# Patient Record
Sex: MALE | Race: WHITE | NOT HISPANIC OR LATINO | URBAN - METROPOLITAN AREA
[De-identification: names, ages, dates, MRNs, and addresses within clinical notes are randomized per-mention and may not be internally consistent; named-entity substitution may affect disease eponyms.]

---

## 2017-06-02 ENCOUNTER — OFFICE (OUTPATIENT)
Dept: URBAN - METROPOLITAN AREA CLINIC 70 | Facility: CLINIC | Age: 67
End: 2017-06-02

## 2017-06-02 VITALS
DIASTOLIC BLOOD PRESSURE: 56 MMHG | HEART RATE: 90 BPM | HEIGHT: 73 IN | WEIGHT: 208 LBS | SYSTOLIC BLOOD PRESSURE: 128 MMHG

## 2017-06-02 DIAGNOSIS — R63.4 ABNORMAL WEIGHT LOSS: ICD-10-CM

## 2017-06-02 DIAGNOSIS — K57.30 DIVERTICULOSIS OF LARGE INTESTINE WITHOUT PERFORATION OR ABS: ICD-10-CM

## 2017-06-02 DIAGNOSIS — R14.0 ABDOMINAL DISTENSION (GASEOUS): ICD-10-CM

## 2017-06-02 DIAGNOSIS — R10.84 GENERALIZED ABDOMINAL PAIN: ICD-10-CM

## 2017-06-02 DIAGNOSIS — K59.1 FUNCTIONAL DIARRHEA: ICD-10-CM

## 2017-06-02 DIAGNOSIS — Z86.010 PERSONAL HISTORY OF COLONIC POLYPS: ICD-10-CM

## 2017-06-02 PROCEDURE — 99205 OFFICE O/P NEW HI 60 MIN: CPT | Performed by: INTERNAL MEDICINE

## 2017-06-02 RX ORDER — ONDANSETRON HYDROCHLORIDE 4 MG/1
TABLET, ORALLY DISINTEGRATING ORAL
Qty: 40 | Refills: 3 | Status: ACTIVE
Start: 2017-06-02

## 2017-06-07 ENCOUNTER — HOSPITAL ENCOUNTER (EMERGENCY)
Facility: HOSPITAL | Age: 67
Discharge: HOME | End: 2017-06-07
Attending: EMERGENCY MEDICINE | Admitting: EMERGENCY MEDICINE

## 2017-06-07 ENCOUNTER — APPOINTMENT (OUTPATIENT)
Dept: GENERAL RADIOLOGY | Facility: HOSPITAL | Age: 67
End: 2017-06-07

## 2017-06-07 ENCOUNTER — APPOINTMENT (OUTPATIENT)
Dept: CT IMAGING | Facility: HOSPITAL | Age: 67
End: 2017-06-07

## 2017-06-07 VITALS
RESPIRATION RATE: 16 BRPM | WEIGHT: 203 LBS | OXYGEN SATURATION: 96 % | SYSTOLIC BLOOD PRESSURE: 142 MMHG | HEART RATE: 82 BPM | HEIGHT: 73 IN | TEMPERATURE: 98.6 F | DIASTOLIC BLOOD PRESSURE: 74 MMHG | BODY MASS INDEX: 26.9 KG/M2

## 2017-06-07 DIAGNOSIS — R10.13 EPIGASTRIC PAIN: Primary | ICD-10-CM

## 2017-06-07 DIAGNOSIS — D72.829 LEUKOCYTOSIS, UNSPECIFIED TYPE: ICD-10-CM

## 2017-06-07 DIAGNOSIS — R42 DIZZINESS: ICD-10-CM

## 2017-06-07 LAB
ALBUMIN SERPL-MCNC: 3.8 G/DL (ref 3.5–5.2)
ALBUMIN/GLOB SERPL: 1.2 G/DL
ALP SERPL-CCNC: 112 U/L (ref 39–117)
ALT SERPL W P-5'-P-CCNC: 26 U/L (ref 1–41)
ANION GAP SERPL CALCULATED.3IONS-SCNC: 18.6 MMOL/L
AST SERPL-CCNC: 17 U/L (ref 1–40)
BASOPHILS # BLD AUTO: 0.07 10*3/MM3 (ref 0–0.2)
BASOPHILS NFR BLD AUTO: 0.5 % (ref 0–1.5)
BILIRUB SERPL-MCNC: 0.2 MG/DL (ref 0.1–1.2)
BILIRUB UR QL STRIP: NEGATIVE
BUN BLD-MCNC: 15 MG/DL (ref 8–23)
BUN/CREAT SERPL: 13.2 (ref 7–25)
CALCIUM SPEC-SCNC: 9.6 MG/DL (ref 8.6–10.5)
CHLORIDE SERPL-SCNC: 99 MMOL/L (ref 98–107)
CLARITY UR: CLEAR
CO2 SERPL-SCNC: 21.4 MMOL/L (ref 22–29)
COLOR UR: YELLOW
CREAT BLD-MCNC: 1.14 MG/DL (ref 0.76–1.27)
DEPRECATED RDW RBC AUTO: 49.7 FL (ref 37–54)
EOSINOPHIL # BLD AUTO: 0.25 10*3/MM3 (ref 0–0.7)
EOSINOPHIL NFR BLD AUTO: 1.7 % (ref 0.3–6.2)
ERYTHROCYTE [DISTWIDTH] IN BLOOD BY AUTOMATED COUNT: 14.8 % (ref 11.5–14.5)
GFR SERPL CREATININE-BSD FRML MDRD: 64 ML/MIN/1.73
GLOBULIN UR ELPH-MCNC: 3.2 GM/DL
GLUCOSE BLD-MCNC: 145 MG/DL (ref 65–99)
GLUCOSE BLDC GLUCOMTR-MCNC: 113 MG/DL (ref 70–130)
GLUCOSE BLDC GLUCOMTR-MCNC: 136 MG/DL (ref 70–130)
GLUCOSE UR STRIP-MCNC: ABNORMAL MG/DL
HCT VFR BLD AUTO: 38.6 % (ref 40.4–52.2)
HGB BLD-MCNC: 12.7 G/DL (ref 13.7–17.6)
HGB UR QL STRIP.AUTO: NEGATIVE
HOLD SPECIMEN: NORMAL
IMM GRANULOCYTES # BLD: 0.05 10*3/MM3 (ref 0–0.03)
IMM GRANULOCYTES NFR BLD: 0.3 % (ref 0–0.5)
KETONES UR QL STRIP: ABNORMAL
LEUKOCYTE ESTERASE UR QL STRIP.AUTO: NEGATIVE
LIPASE SERPL-CCNC: 93 U/L (ref 13–60)
LYMPHOCYTES # BLD AUTO: 2.08 10*3/MM3 (ref 0.9–4.8)
LYMPHOCYTES NFR BLD AUTO: 13.7 % (ref 19.6–45.3)
MAGNESIUM SERPL-MCNC: 2 MG/DL (ref 1.6–2.4)
MCH RBC QN AUTO: 30.4 PG (ref 27–32.7)
MCHC RBC AUTO-ENTMCNC: 32.9 G/DL (ref 32.6–36.4)
MCV RBC AUTO: 92.3 FL (ref 79.8–96.2)
MONOCYTES # BLD AUTO: 1.23 10*3/MM3 (ref 0.2–1.2)
MONOCYTES NFR BLD AUTO: 8.1 % (ref 5–12)
NEUTROPHILS # BLD AUTO: 11.46 10*3/MM3 (ref 1.9–8.1)
NEUTROPHILS NFR BLD AUTO: 75.7 % (ref 42.7–76)
NITRITE UR QL STRIP: NEGATIVE
PH UR STRIP.AUTO: <=5 [PH] (ref 5–8)
PLATELET # BLD AUTO: 421 10*3/MM3 (ref 140–500)
PMV BLD AUTO: 9.2 FL (ref 6–12)
POTASSIUM BLD-SCNC: 4.6 MMOL/L (ref 3.5–5.2)
PROT SERPL-MCNC: 7 G/DL (ref 6–8.5)
PROT UR QL STRIP: NEGATIVE
RBC # BLD AUTO: 4.18 10*6/MM3 (ref 4.6–6)
SODIUM BLD-SCNC: 139 MMOL/L (ref 136–145)
SP GR UR STRIP: >=1.03 (ref 1–1.03)
TROPONIN T SERPL-MCNC: <0.01 NG/ML (ref 0–0.03)
UROBILINOGEN UR QL STRIP: ABNORMAL
WBC NRBC COR # BLD: 15.14 10*3/MM3 (ref 4.5–10.7)
WHOLE BLOOD HOLD SPECIMEN: NORMAL

## 2017-06-07 PROCEDURE — 0 IOPAMIDOL 61 % SOLUTION: Performed by: EMERGENCY MEDICINE

## 2017-06-07 PROCEDURE — 93005 ELECTROCARDIOGRAM TRACING: CPT

## 2017-06-07 PROCEDURE — 82962 GLUCOSE BLOOD TEST: CPT

## 2017-06-07 PROCEDURE — 70450 CT HEAD/BRAIN W/O DYE: CPT

## 2017-06-07 PROCEDURE — 93010 ELECTROCARDIOGRAM REPORT: CPT | Performed by: INTERNAL MEDICINE

## 2017-06-07 PROCEDURE — 80053 COMPREHEN METABOLIC PANEL: CPT | Performed by: EMERGENCY MEDICINE

## 2017-06-07 PROCEDURE — 36415 COLL VENOUS BLD VENIPUNCTURE: CPT | Performed by: EMERGENCY MEDICINE

## 2017-06-07 PROCEDURE — 96360 HYDRATION IV INFUSION INIT: CPT

## 2017-06-07 PROCEDURE — 81003 URINALYSIS AUTO W/O SCOPE: CPT | Performed by: EMERGENCY MEDICINE

## 2017-06-07 PROCEDURE — 71020 HC CHEST PA AND LATERAL: CPT

## 2017-06-07 PROCEDURE — 84484 ASSAY OF TROPONIN QUANT: CPT | Performed by: EMERGENCY MEDICINE

## 2017-06-07 PROCEDURE — 83735 ASSAY OF MAGNESIUM: CPT | Performed by: EMERGENCY MEDICINE

## 2017-06-07 PROCEDURE — 74177 CT ABD & PELVIS W/CONTRAST: CPT

## 2017-06-07 PROCEDURE — 99284 EMERGENCY DEPT VISIT MOD MDM: CPT

## 2017-06-07 PROCEDURE — 83690 ASSAY OF LIPASE: CPT | Performed by: EMERGENCY MEDICINE

## 2017-06-07 PROCEDURE — 85025 COMPLETE CBC W/AUTO DIFF WBC: CPT | Performed by: EMERGENCY MEDICINE

## 2017-06-07 RX ORDER — LISINOPRIL 20 MG/1
20 TABLET ORAL DAILY
COMMUNITY
End: 2020-02-25

## 2017-06-07 RX ORDER — ONDANSETRON 4 MG/1
4 TABLET, ORALLY DISINTEGRATING ORAL EVERY 8 HOURS PRN
COMMUNITY
End: 2018-06-21

## 2017-06-07 RX ORDER — DICYCLOMINE HCL 20 MG
20 TABLET ORAL EVERY 6 HOURS PRN
Qty: 15 TABLET | Refills: 0 | Status: SHIPPED | OUTPATIENT
Start: 2017-06-07 | End: 2018-06-21

## 2017-06-07 RX ORDER — NIACIN 500 MG
500 TABLET ORAL NIGHTLY
COMMUNITY
End: 2018-06-21

## 2017-06-07 RX ORDER — SODIUM CHLORIDE 0.9 % (FLUSH) 0.9 %
10 SYRINGE (ML) INJECTION AS NEEDED
Status: DISCONTINUED | OUTPATIENT
Start: 2017-06-07 | End: 2017-06-07 | Stop reason: HOSPADM

## 2017-06-07 RX ORDER — ASPIRIN 325 MG
325 TABLET ORAL DAILY
COMMUNITY
End: 2018-06-30 | Stop reason: HOSPADM

## 2017-06-07 RX ORDER — ATORVASTATIN CALCIUM 80 MG/1
40 TABLET, FILM COATED ORAL DAILY
COMMUNITY

## 2017-06-07 RX ORDER — GLIMEPIRIDE 4 MG/1
4 TABLET ORAL
COMMUNITY
End: 2018-06-21

## 2017-06-07 RX ORDER — PANTOPRAZOLE SODIUM 40 MG/1
40 TABLET, DELAYED RELEASE ORAL DAILY
Qty: 21 TABLET | Refills: 0 | Status: SHIPPED | OUTPATIENT
Start: 2017-06-07 | End: 2018-06-21

## 2017-06-07 RX ORDER — EZETIMIBE 10 MG/1
10 TABLET ORAL DAILY
COMMUNITY
End: 2018-06-21

## 2017-06-07 RX ORDER — MECLIZINE HYDROCHLORIDE 25 MG/1
25 TABLET ORAL EVERY 6 HOURS PRN
Qty: 20 TABLET | Refills: 0 | Status: SHIPPED | OUTPATIENT
Start: 2017-06-07 | End: 2018-06-21

## 2017-06-07 RX ADMIN — IOPAMIDOL 85 ML: 612 INJECTION, SOLUTION INTRAVENOUS at 19:07

## 2017-06-07 RX ADMIN — SODIUM CHLORIDE 1000 ML: 9 INJECTION, SOLUTION INTRAVENOUS at 18:52

## 2017-06-07 NOTE — ED NOTES
"Pt reports he just went to bathroom so unable to provide urine sample at this time. Pt reports for about 3 weeks he has had dizziness when ambulating and nausea. He also reports unexplained weight loss of 10 lbs over 3 weeks. Pt seen GI and PCP MD who tested pt for cdiff and was neg and he was told his bp dropped a lot when he stood up and MDs office. Last time pt had diarrhea was a few days ago. Pt reports \"cramping like i have gas\" intermittently on upper abd and LLQ abd. Reassurance given; call light in reach. Pts breathing even and unlabored. Pt appears in NAD at this time. Family at bedside.      Kelin Quesada RN  06/07/17 9019    "

## 2017-06-07 NOTE — ED TRIAGE NOTES
"Pt and wife informed that pt can not have anything to eat or drink until evaluated by MD and that staff would check his blood sugar if \"his sugar drops because he is type two diabetic\".   "

## 2017-06-07 NOTE — ED NOTES
"Pt c/o nausea and dizziness for three weeks. Has been evaluated already at \"[facility]\". It was suggested he come to ER because \"all my tests were normal there\".      Kylie Diego RN  06/07/17 5240    "

## 2017-06-07 NOTE — ED PROVIDER NOTES
EMERGENCY DEPARTMENT ENCOUNTER    CHIEF COMPLAINT  Chief Complaint: nausea, dizziness  History given by: patient  History limited by: nothing  Room Number: 20/20  PMD: Asad Garay MD  GI: Dr. Franco    HPI:  Pt is a 66 y.o. male who presents complaining of nausea and lightheadedness with standing x3 weeks. Pt also c/o diffuse, intermittent abdominal pain and diarrhea. Pt states his abdominal pain is alleviated with eating but he has lost 13 lbs in 3 weeks. Pt denies fever, chills, shortness of breath, dysuria, tinnitus, or headache.   Pt states he saw his PCP at the onset of his sx and had labs drawn that showed an elevated WBC so he was started on amoxicillin. Pt states he took 3 doses and then saw his GI (Dr. Franco) who advised him to stop the abx bc he had signs of C.diff. Pt states the stool culture was negative for C.diff so the Pt was advised to come to the ED.   Pt denies hx of abdominal surgery and ulcer. Pt denies any new/changes to his medications.     Duration:  3 weeks  Onset: gradual  Timing: intermittent  Location: diffuse  Intensity/Severity: moderate  Progression: unchanged  Associated Symptoms: abdominal pain, diarrhea, weight loss  Aggravating Factors: none  Alleviating Factors: eating, rest  Previous Episodes: none  Treatment before arrival: none    PAST MEDICAL HISTORY  Active Ambulatory Problems     Diagnosis Date Noted   • No Active Ambulatory Problems     Resolved Ambulatory Problems     Diagnosis Date Noted   • No Resolved Ambulatory Problems     Past Medical History:   Diagnosis Date   • Diabetes mellitus    • Hypertension        PAST SURGICAL HISTORY  Past Surgical History:   Procedure Laterality Date   • COLONOSCOPY W/ POLYPECTOMY         FAMILY HISTORY  History reviewed. No pertinent family history.    SOCIAL HISTORY  Social History     Social History   • Marital status:      Spouse name: N/A   • Number of children: N/A   • Years of education: N/A     Occupational History    • Not on file.     Social History Main Topics   • Smoking status: Former Smoker   • Smokeless tobacco: Not on file   • Alcohol use Yes      Comment: social   • Drug use: No   • Sexual activity: Not on file     Other Topics Concern   • Not on file     Social History Narrative   • No narrative on file       ALLERGIES  Review of patient's allergies indicates no known allergies.    REVIEW OF SYSTEMS  Review of Systems   Constitutional: Negative for chills, fever and unexpected weight change (-13 lbs in 3 wks).   HENT: Negative for sore throat and trouble swallowing.    Eyes: Negative for visual disturbance.   Respiratory: Negative for cough and shortness of breath.    Cardiovascular: Negative for chest pain and leg swelling.   Gastrointestinal: Positive for abdominal pain (diffuse), diarrhea and nausea. Negative for vomiting.   Endocrine: Negative.    Genitourinary: Negative for decreased urine volume and frequency.   Musculoskeletal: Negative for neck pain.   Skin: Negative for rash.   Allergic/Immunologic: Negative.    Neurological: Positive for dizziness. Negative for weakness, numbness and headaches.   Hematological: Negative.    Psychiatric/Behavioral: Negative.    All other systems reviewed and are negative.      PHYSICAL EXAM  ED Triage Vitals   Temp Heart Rate Resp BP SpO2   06/07/17 1506 06/07/17 1506 06/07/17 1506 06/07/17 1542 06/07/17 1506   98.4 °F (36.9 °C) 78 16 113/68 98 %      Temp src Heart Rate Source Patient Position BP Location FiO2 (%)   06/07/17 1506 06/07/17 1542 06/07/17 1645 06/07/17 1645 --   Tympanic Monitor Sitting Right arm        Physical Exam   Constitutional: He is oriented to person, place, and time and well-developed, well-nourished, and in no distress.   HENT:   Head: Normocephalic and atraumatic.   Eyes: EOM are normal. Pupils are equal, round, and reactive to light. Right eye exhibits no nystagmus. Left eye exhibits no nystagmus.   Neck: Normal range of motion. Neck supple.    Cardiovascular: Normal rate, regular rhythm and normal heart sounds.    Pulmonary/Chest: Effort normal and breath sounds normal. No respiratory distress.   Abdominal: Soft. There is tenderness (mild) in the right upper quadrant and epigastric area. There is no rebound and no guarding.   Musculoskeletal: Normal range of motion. He exhibits no edema.   Neurological: He is alert and oriented to person, place, and time. He has normal sensation and normal strength. He has a normal Finger-Nose-Finger Test and a normal Heel to Castle Test.   Skin: Skin is warm and dry.   Psychiatric: Mood and affect normal.   Nursing note and vitals reviewed.      LAB RESULTS  Lab Results (last 24 hours)     Procedure Component Value Units Date/Time    POC Glucose Fingerstick [[id number]]  (Abnormal) Collected:  06/07/17 1540    Specimen:  Blood Updated:  06/07/17 1547     Glucose 136 (H) mg/dL     Narrative:       Meter: [id number] : [id number] Pablito Waters    CBC & Differential [[id number]] Collected:  06/07/17 1602    Specimen:  Blood Updated:  06/07/17 1626    Narrative:       The following orders were created for panel order CBC & Differential.  Procedure                               Abnormality         Status                     ---------                               -----------         ------                     CBC Auto Differential[[id number]]        Abnormal            Final result                 Please view results for these tests on the individual orders.    Comprehensive Metabolic Panel [[id number]]  (Abnormal) Collected:  06/07/17 1602    Specimen:  Blood Updated:  06/07/17 1633     Glucose 145 (H) mg/dL      BUN 15 mg/dL      Creatinine 1.14 mg/dL      Sodium 139 mmol/L      Potassium 4.6 mmol/L      Chloride 99 mmol/L      CO2 21.4 (L) mmol/L      Calcium 9.6 mg/dL      Total Protein 7.0 g/dL      Albumin 3.80 g/dL      ALT (SGPT) 26 U/L      AST (SGOT) 17 U/L      Alkaline Phosphatase 112 U/L      Total Bilirubin 0.2  mg/dL      eGFR Non African Amer 64 mL/min/1.73      Globulin 3.2 gm/dL      A/G Ratio 1.2 g/dL      BUN/Creatinine Ratio 13.2     Anion Gap 18.6 mmol/L     Troponin [[id number]]  (Normal) Collected:  06/07/17 1602    Specimen:  Blood Updated:  06/07/17 1635     Troponin T <0.010 ng/mL     Narrative:       Troponin T Reference Ranges:  Less than 0.03 ng/mL:    Negative for AMI  0.03 to 0.09 ng/mL:      Indeterminant for AMI  Greater than 0.09 ng/mL: Positive for AMI    Magnesium [[id number]]  (Normal) Collected:  06/07/17 1602    Specimen:  Blood Updated:  06/07/17 1633     Magnesium 2.0 mg/dL     CBC Auto Differential [[id number]]  (Abnormal) Collected:  06/07/17 1602    Specimen:  Blood Updated:  06/07/17 1626     WBC 15.14 (H) 10*3/mm3      RBC 4.18 (L) 10*6/mm3      Hemoglobin 12.7 (L) g/dL      Hematocrit 38.6 (L) %      MCV 92.3 fL      MCH 30.4 pg      MCHC 32.9 g/dL      RDW 14.8 (H) %      RDW-SD 49.7 fl      MPV 9.2 fL      Platelets 421 10*3/mm3      Neutrophil % 75.7 %      Lymphocyte % 13.7 (L) %      Monocyte % 8.1 %      Eosinophil % 1.7 %      Basophil % 0.5 %      Immature Grans % 0.3 %      Neutrophils, Absolute 11.46 (H) 10*3/mm3      Lymphocytes, Absolute 2.08 10*3/mm3      Monocytes, Absolute 1.23 (H) 10*3/mm3      Eosinophils, Absolute 0.25 10*3/mm3      Basophils, Absolute 0.07 10*3/mm3      Immature Grans, Absolute 0.05 (H) 10*3/mm3     Lipase [[id number]]  (Abnormal) Collected:  06/07/17 1602    Specimen:  Blood Updated:  06/07/17 2119     Lipase 93 (H) U/L     Urinalysis With / Culture If Indicated [[id number]]  (Abnormal) Collected:  06/07/17 1856    Specimen:  Urine from Urine, Clean Catch Updated:  06/07/17 1919     Color, UA Yellow     Appearance, UA Clear     pH, UA <=5.0     Specific Gravity, UA >=1.030     Glucose, UA >=1000 mg/dL (3+) (A)     Ketones, UA Trace (A)     Bilirubin, UA Negative     Blood, UA Negative     Protein, UA Negative     Leuk Esterase, UA Negative     Nitrite, UA  Negative     Urobilinogen, UA 0.2 E.U./dL    Narrative:       Urine microscopic not indicated.    POC Glucose Fingerstick [[id number]]  (Normal) Collected:  06/07/17 2046    Specimen:  Blood Updated:  06/07/17 2047     Glucose 113 mg/dL     Narrative:       Meter: [id number] : [id number] Dipak Neville      I ordered the above labs and reviewed the results    RADIOLOGY  CT Abdomen Pelvis With Contrast   Final Result           1. Colonic diverticulosis. No acute inflammatory process of bowel is   identified. Follow-up as clinical indications persist.   2. Slight stranding about the pancreatic tail, correlate clinically to   exclude possibility of acute pancreatitis.   3. No obstructive uropathy. Mildly enlarged prostate.   4. Aneurysmal abdominal aorta with plaque ulceration. Calcification, and   likely mild narrowing at the origin of superior and inferior mesenteric   arteries.   5. 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       Discussed by telephone with Dr. Kwok at time of interpretation, 1937   on 06/07/2017.       This report was finalized on 6/7/2017 7:42 PM by Dr. Paulino Lopez MD.          CT Head Without Contrast   Final Result           No acute intracranial hemorrhage or hydrocephalus. Chronic-appearing   changes of the brain. If there is further clinical concern, MRI could be   considered for further evaluation.       Discussed by telephone with Dr. Kwok at time of interpretation, 19 28   on 6/7/2017.       This report was finalized on 6/7/2017 7:29 PM by Dr. Paulino Lopez MD.          XR Chest 2 View   Preliminary Result   No acute process.           I ordered the above noted radiological studies. Interpreted by radiologist. Reviewed by me in PACS.       PROCEDURES  Procedures      PROGRESS AND CONSULTS  ED Course     6:39 PM  Ordered CT abd/pelvis and CT Head for further evaluation.     6:54 PM  Pt is not  orthostatic.     8:58 PM  Ordered lipase for further evaluation.     9:28 PM  Pt rechecked and is resting comfortably. Pt has tolerated liquids with no difficulty while in the ED. BP- 135/68 HR- 82 Temp- 98.4 °F (36.9 °C) (Tympanic) O2 sat- 95%. All pertinent lab/imaging/EKG findings discussed including mildly elevated WBC, slightly low Hgb as well as negative CT head, CXR and enlarged aorta and possible pancreatic abnormality seen on CT abd. Discussed with Pt that although lipase is normal it is still possible that he may have a mild pancreatitis. Pt/family verbalized understanding and agree with plan to discharge. Advised Pt to follow a clear liquid diet for the next few days. All questions and concerns addressed at this time.         MEDICAL DECISION MAKING  Results were reviewed/discussed with the patient and they were also made aware of online access. Pt also made aware that some labs, such as cultures, will not be resulted during ER visit and follow up with PMD is necessary.     MDM  Number of Diagnoses or Management Options  Dizziness:   Epigastric pain:   Leukocytosis, unspecified type:   Diagnosis management comments: Patient's white blood cell count was elevated and he was mildly anemic.  Remainder of his labs were unremarkable.  CT abdomen and pelvis showed some mild stranding around the tail of the pancreas.  There is also some haziness of the mesentery.  Head CT was unremarkable.  The patient did have mild epigastric tenderness on exam.  He may have mild pancreatitis even though his lipase was normal.  The patient has not been vomiting and says that his pain actually improves after he eats.  The patient was given IV fluids in the ER.  He will be discharged with prescriptions for Protonix and meclizine.  The patient was advised of the importance of having a follow-up CT scan of his abdomen and pelvis.  Patient was also advised to follow-up with Dr. Franco, his gastroenterologist.       Amount and/or  Complexity of Data Reviewed  Clinical lab tests: reviewed and ordered (WBC: 15.14  Lipase: 93)  Tests in the radiology section of CPT®: ordered and reviewed (CXR: Negative acute  CT Head: Negative acute  CT abd/pelvis:Colonic diverticulosis. No acute inflammatory process of bowel is identified. Follow-up as clinical indications persist. Slight stranding about the pancreatic tail, correlate clinically to exclude possibility of acute pancreatitis. No obstructive uropathy. Mildly enlarged prostate. Aneurysmal abdominal aorta with plaque ulceration. Calcification, and likely mild narrowing at the origin of superior and inferior mesenteric  Arteries. 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 Lopez)  Independent visualization of images, tracings, or specimens: yes    Patient Progress  Patient progress: stable         DIAGNOSIS  Final diagnoses:   Epigastric pain   Dizziness   Leukocytosis, unspecified type       DISPOSITION  Disposition: Discharged.    Patient discharged in stable condition.    Reviewed implications of results, diagnosis, meds, responsibility to follow up, warning signs and symptoms of possible worsening, potential complications and reasons to return to ER.    Patient/Family voiced understanding of above instructions.    Discussed plan for discharge, as there is no emergent indication for admission.  Pt/family is agreeable and understands need for follow up and repeat testing.  Pt is aware that discharge does not mean that nothing is wrong but it indicates no emergency is present and they must continue care with follow-up as given below or physician of their choice.     FOLLOW-UP  Asad Garay MD  [address] [facility] [address] [phone]    Call in 2 days  If symptoms persist         Medication List      New Prescriptions          dicyclomine 20 MG tablet    Commonly known as:  BENTYL   Take 1 tablet by mouth Every 6 (Six) Hours As Needed (abdominal pain).       meclizine 25 MG tablet   Commonly known as:  ANTIVERT   Take 1 tablet by mouth Every 6 (Six) Hours As Needed for dizziness.       pantoprazole 40 MG EC tablet   Commonly known as:  PROTONIX   Take 1 tablet by mouth Daily.             Latest Documented Vital Signs:  As of 9:43 PM  BP- 135/68 HR- 82 Temp- 98.4 °F (36.9 °C) (Tympanic) O2 sat- 95%    --  Documentation assistance provided by joaquina Daly for Dr. Kwok.  Information recorded by the scribe was done at my direction and has been verified and validated by me.        Pao Daly  06/07/17 4428       Ruiz Kwok MD  06/07/17 9373

## 2017-06-08 NOTE — DISCHARGE INSTRUCTIONS
Follow-up with your doctor in the next several days if symptoms have not resolved.  You will need a repeat CT scan of your abdomen pelvis within the next several weeks.  Take medications as prescribed.  Return to the emergency department for worsening pain, vomiting, fever, worsening dizziness, or other concern.  Stay on a clear liquid diet for the next 24 hours.

## 2017-06-08 NOTE — ED NOTES
"Pt reports beginning to feel \"queezy\" MD Kwok notified and approved to take home sandie.      Kelin Quesada RN  06/07/17 2051    "

## 2017-06-08 NOTE — ED NOTES
Pt requested to have his FSBG checked at this time. It is WDL.      Kelin Quesada RN  06/07/17 2052

## 2017-06-09 ENCOUNTER — TELEPHONE (OUTPATIENT)
Dept: SOCIAL WORK | Facility: HOSPITAL | Age: 67
End: 2017-06-09

## 2018-06-18 ENCOUNTER — PREP FOR SURGERY (OUTPATIENT)
Dept: OTHER | Facility: HOSPITAL | Age: 68
End: 2018-06-18

## 2018-06-18 DIAGNOSIS — M16.12 PRIMARY OSTEOARTHRITIS OF LEFT HIP: Primary | ICD-10-CM

## 2018-06-18 RX ORDER — CEFAZOLIN SODIUM 2 G/100ML
2 INJECTION, SOLUTION INTRAVENOUS ONCE
Status: CANCELLED | OUTPATIENT
Start: 2018-06-29 | End: 2018-06-29

## 2018-06-18 RX ORDER — PREGABALIN 75 MG/1
75 CAPSULE ORAL ONCE
Status: CANCELLED | OUTPATIENT
Start: 2018-06-29 | End: 2018-06-29

## 2018-06-18 RX ORDER — OXYCODONE HCL 10 MG/1
10 TABLET, FILM COATED, EXTENDED RELEASE ORAL ONCE
Status: CANCELLED | OUTPATIENT
Start: 2018-06-29 | End: 2018-06-29

## 2018-06-18 RX ORDER — ACETAMINOPHEN 500 MG
1000 TABLET ORAL ONCE
Status: CANCELLED | OUTPATIENT
Start: 2018-06-29 | End: 2018-06-29

## 2018-06-18 NOTE — H&P
Provider: THIEN PETER MD  HPI  TOMER is a 67 year old male whose main reason for today's visit is pain in the left hip which has been present for 6 months.  The patient denies an injury.  The patient is on pain meds(Ibuprofen).  The patient states that he is having a dull pain which he rates at a 7 on a scale from 1-10.  The pain occurs intermittently.  The pain occurs on the left side only.  The patient states that activity makes it better.   He has difficulty getting in and out of his car and putting his shoes and socks on. His pain bothers him sometimes at night and prevents sleep. He ambulates independently.  He complains of a limp.  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   He denies any history of MRSA, DVT.  He has a history of diabetes type 2.  Denies any cardiac problems.  Review of Systems:  Patient denies: Abdominal Pain, Bleeding, Chest Pain, Convulsions/Seizure, Decreased Motion, Depression, Difficulty Swallowing, Easy Bruisability, Emotional Disturbances, Eyes or Vision Problems, Fecal Incontinence, Fever/Chills, Headaches, Increased Thirst, Increased Hunger, Insomnia, Joint Pain, Nausea/Vomiting, Night Sweats, Poor Balance, Persistent Cough, Rash, Shortness of Breath, Shortness of Breath While Lying down, Skin Problems, Urinary Retention and Weakness.  Allergies:  * no known allergies  Medications:  * senior multi vitamin   aspirin 325 mg oral tablet (aspirin)   lisinopril 20 mg oral tablet (lisinopril)   atorvastatin calcium 80 mg oral tablet (atorvastatin calcium)   novolog flexpen 100 unit/ml subcutaneous solution pen-injector (insulin aspart)   tresiba flextouch 200 unit/ml subcutaneous solution pen-injector (insulin degludec)   Patient History of:  BLOOD CLOTS/EMBOLISM - NEGATIVE  DIABETES - TYPE 2  Surgical History:  LT ANKLE-   Known Family History of:  diabetes-sibling  Social History:  TOMER SPANN is a  67 year old male.  He has quit using tobacco  products.  He has not fallen in the last 12 months.      Past medical, social, family histories and ROS reviewed today with the patient and changes documented in the chart (06/18/2018).  PCP Dr. PRETTY GIL, JAYRO    Physical Exam  Height:  73 in.    Weight:  240 lbs.     BMI:  31.78  Pulse:  82  Blood pressure:  142 / 80 mm Hg    Gait: antalgic               Ambulation: patient ambulates without assistive devices      Mental/HEENT/Cardio/Skin  The patient's general appearance was well nourished, well hydrated, no acute distress.  Orientation was alert and oritented x 3.  The patient's mood was normal.  A head exam revealed normocephalic/atraumatic.  An eye exam revealed pupils equal.  Pulmonary exam shows normal air exchange, no labored breathing, or shortness of breath.  A skin exam showed normal temperature and color in the area of examination.      Left Hip/Pelvis  Normal:  Neurovascular status is intact.  Sensation in hip is normal.  DP Pulse is 3+.  PT PULSE is 3+.  Capillary Refill is normal.  Reflexes are normal.    ROM  Internal Rotation: <30  External Rotation: <40  Abduction: <45  Adduction: <15  Flexion: <100  Extension: <5    Tenderness  Location: groin  Radiation of Pain: anterior thigh  Pain w/ Palpation: none  Gabriel Test: negative  Impingement: negative  Straight Leg Raise: negative    Strength  Quad: normal  Abduction: normal  Adduction: normal  Flexion: normal  Extension: normal  Positive Stinchfield sign  Positive Trendelenburg sign  Attempted movements of the left hip with painful and restricted.      Imaging/Diagnostic Studies  X-rays of the Left Hip [AP;AP pelvis;Frog Lateral] were ordered and reviewed today.      X-rays of the left hip/pelvisX-rays show osteoarthritis is severe.  bone on bone arthrosis.    Impression  Left hip primary osteoarthritis ([id number])    Plan  Options and alternatives were discussed in detail with the patient.   The patient has reached the point of disability  and failed nonoperative management.   The patient is indicated for a total hip replacement .   The likely Risks and benefits of the procedure including but not limited to infection, DVT, pulmonary embolism, recurrent dislocation, Leg length discrepancy, periprosthetic fractures, possibility of injury to nerves or vessels, tendons, possibility of morbidity and mortality likely medical risks for stroke and heart attack, have been discussed in detail.   Despite the risks involved the patient would like to proceed. The patient is being scheduled for a left Total HIP arthroplasty by direct anterior approach at [facility] on June 29 of 2018.   I will request for Medical and cardiac clearance from Dr. PRETTY GIL, MD JAYRO.   Postoperative DVT prophylaxis - Patient has no high risk factors Plan for Aspirin.  Preoperative antibiotic prophylaxis - Plan for SCIP protocol with Cephazolin weight based.   Patient does not use tobacco.      We discussed the benefits of surgical intervention, as well as alternative treatments.  Potential surgical risks and complications include but are not limited to DVT, infection, neurovascular injury, fracture, implant wear, failure, possible need for revision surgery, loss of motion, dislocation, limb length changes.  Sufficient opportunity was given to discuss the condition and treatment plan with the doctor, and all questions were answered for the patient.  Nonsurgical measures such as injections, medications, or physical therapy may not offer significant relief to this patient.  The discussion lasted 30 minutes.  We discussed the benefits of the following procedures, as well as alternative treatments: LEFT Total Hip Arthroplasty  Potential surgical risks and complications include but are not limited to DVT, infection, neurovascular injury, fracture, implant wear, failure, possible need for revision surgery, loss of motion, dislocation, limb length changes.  Sufficient  opportunity was given to discuss the condition and treatment plan with the doctor, and all questions were answered for the patient.  Nonsurgical measures such as injections, medications and or physical therapy may not offer significant relief to this patient.  The discussion lasted 30 minutes.  We discussed the benefits of the following procedures, as well as alternative treatments: LEFT Total Hip Arthroplasty  Potential surgical risks and complications include but are not limited to DVT, infection, neurovascular injury, fracture, implant wear, failure, possible need for revision surgery, loss of motion, dislocation, limb length changes.  Sufficient opportunity was given to discuss the condition and treatment plan with the doctor, and all questions were answered for the patient.  Nonsurgical measures such as injections, medications and or physical therapy may not offer significant relief to this patient.  The discussion lasted 30 minutes.      Vicente should follow up with THIEN PETER MD post op.

## 2018-06-21 ENCOUNTER — APPOINTMENT (OUTPATIENT)
Dept: PREADMISSION TESTING | Facility: HOSPITAL | Age: 68
End: 2018-06-21

## 2018-06-21 ENCOUNTER — HOSPITAL ENCOUNTER (OUTPATIENT)
Dept: GENERAL RADIOLOGY | Facility: HOSPITAL | Age: 68
Discharge: HOME | End: 2018-06-21

## 2018-06-21 ENCOUNTER — APPOINTMENT (OUTPATIENT)
Dept: GENERAL RADIOLOGY | Facility: HOSPITAL | Age: 68
End: 2018-06-21

## 2018-06-21 ENCOUNTER — HOSPITAL ENCOUNTER (OUTPATIENT)
Dept: GENERAL RADIOLOGY | Facility: HOSPITAL | Age: 68
Discharge: HOME | End: 2018-06-21
Admitting: ORTHOPAEDIC SURGERY

## 2018-06-21 VITALS
RESPIRATION RATE: 20 BRPM | SYSTOLIC BLOOD PRESSURE: 154 MMHG | BODY MASS INDEX: 31.93 KG/M2 | OXYGEN SATURATION: 95 % | HEIGHT: 73 IN | WEIGHT: 240.9 LBS | HEART RATE: 84 BPM | DIASTOLIC BLOOD PRESSURE: 75 MMHG | TEMPERATURE: 97.9 F

## 2018-06-21 DIAGNOSIS — M16.12 PRIMARY OSTEOARTHRITIS OF LEFT HIP: ICD-10-CM

## 2018-06-21 LAB
ALBUMIN SERPL-MCNC: 4.3 G/DL (ref 3.5–5.2)
ALBUMIN/GLOB SERPL: 1.5 G/DL
ALP SERPL-CCNC: 78 U/L (ref 39–117)
ALT SERPL W P-5'-P-CCNC: 30 U/L (ref 1–41)
ANION GAP SERPL CALCULATED.3IONS-SCNC: 16.3 MMOL/L
APTT PPP: 27.8 SECONDS (ref 22.7–35.4)
AST SERPL-CCNC: 22 U/L (ref 1–40)
BASOPHILS # BLD AUTO: 0.02 10*3/MM3 (ref 0–0.2)
BASOPHILS NFR BLD AUTO: 0.2 % (ref 0–1.5)
BILIRUB SERPL-MCNC: 0.4 MG/DL (ref 0.1–1.2)
BILIRUB UR QL STRIP: NEGATIVE
BUN BLD-MCNC: 16 MG/DL (ref 8–23)
BUN/CREAT SERPL: 19.3 (ref 7–25)
CALCIUM SPEC-SCNC: 9.8 MG/DL (ref 8.6–10.5)
CHLORIDE SERPL-SCNC: 99 MMOL/L (ref 98–107)
CLARITY UR: CLEAR
CO2 SERPL-SCNC: 20.7 MMOL/L (ref 22–29)
COLOR UR: YELLOW
CREAT BLD-MCNC: 0.83 MG/DL (ref 0.76–1.27)
DEPRECATED RDW RBC AUTO: 43.2 FL (ref 37–54)
EOSINOPHIL # BLD AUTO: 0.11 10*3/MM3 (ref 0–0.7)
EOSINOPHIL NFR BLD AUTO: 1.4 % (ref 0.3–6.2)
ERYTHROCYTE [DISTWIDTH] IN BLOOD BY AUTOMATED COUNT: 13.1 % (ref 11.5–14.5)
GFR SERPL CREATININE-BSD FRML MDRD: 93 ML/MIN/1.73
GLOBULIN UR ELPH-MCNC: 2.9 GM/DL
GLUCOSE BLD-MCNC: 251 MG/DL (ref 65–99)
GLUCOSE UR STRIP-MCNC: ABNORMAL MG/DL
HBA1C MFR BLD: 7.2 % (ref 4.8–5.6)
HCT VFR BLD AUTO: 42.2 % (ref 40.4–52.2)
HGB BLD-MCNC: 14.5 G/DL (ref 13.7–17.6)
HGB UR QL STRIP.AUTO: NEGATIVE
IMM GRANULOCYTES # BLD: 0.03 10*3/MM3 (ref 0–0.03)
IMM GRANULOCYTES NFR BLD: 0.4 % (ref 0–0.5)
INR PPP: 1.01 (ref 0.9–1.1)
KETONES UR QL STRIP: ABNORMAL
LEUKOCYTE ESTERASE UR QL STRIP.AUTO: NEGATIVE
LYMPHOCYTES # BLD AUTO: 1.89 10*3/MM3 (ref 0.9–4.8)
LYMPHOCYTES NFR BLD AUTO: 23.5 % (ref 19.6–45.3)
MCH RBC QN AUTO: 31.3 PG (ref 27–32.7)
MCHC RBC AUTO-ENTMCNC: 34.4 G/DL (ref 32.6–36.4)
MCV RBC AUTO: 90.9 FL (ref 79.8–96.2)
MONOCYTES # BLD AUTO: 0.6 10*3/MM3 (ref 0.2–1.2)
MONOCYTES NFR BLD AUTO: 7.5 % (ref 5–12)
NEUTROPHILS # BLD AUTO: 5.43 10*3/MM3 (ref 1.9–8.1)
NEUTROPHILS NFR BLD AUTO: 67.4 % (ref 42.7–76)
NITRITE UR QL STRIP: NEGATIVE
PH UR STRIP.AUTO: <=5 [PH] (ref 5–8)
PLATELET # BLD AUTO: 227 10*3/MM3 (ref 140–500)
PMV BLD AUTO: 10.5 FL (ref 6–12)
POTASSIUM BLD-SCNC: 4.6 MMOL/L (ref 3.5–5.2)
PROT SERPL-MCNC: 7.2 G/DL (ref 6–8.5)
PROT UR QL STRIP: NEGATIVE
PROTHROMBIN TIME: 13.1 SECONDS (ref 11.7–14.2)
RBC # BLD AUTO: 4.64 10*6/MM3 (ref 4.6–6)
SODIUM BLD-SCNC: 136 MMOL/L (ref 136–145)
SP GR UR STRIP: 1.03 (ref 1–1.03)
UROBILINOGEN UR QL STRIP: ABNORMAL
WBC NRBC COR # BLD: 8.05 10*3/MM3 (ref 4.5–10.7)

## 2018-06-21 PROCEDURE — 85025 COMPLETE CBC W/AUTO DIFF WBC: CPT | Performed by: ORTHOPAEDIC SURGERY

## 2018-06-21 PROCEDURE — 80053 COMPREHEN METABOLIC PANEL: CPT | Performed by: ORTHOPAEDIC SURGERY

## 2018-06-21 PROCEDURE — 73502 X-RAY EXAM HIP UNI 2-3 VIEWS: CPT

## 2018-06-21 PROCEDURE — 83036 HEMOGLOBIN GLYCOSYLATED A1C: CPT | Performed by: ORTHOPAEDIC SURGERY

## 2018-06-21 PROCEDURE — 93005 ELECTROCARDIOGRAM TRACING: CPT

## 2018-06-21 PROCEDURE — 85730 THROMBOPLASTIN TIME PARTIAL: CPT | Performed by: ORTHOPAEDIC SURGERY

## 2018-06-21 PROCEDURE — 85610 PROTHROMBIN TIME: CPT | Performed by: ORTHOPAEDIC SURGERY

## 2018-06-21 PROCEDURE — 71046 X-RAY EXAM CHEST 2 VIEWS: CPT

## 2018-06-21 PROCEDURE — 81003 URINALYSIS AUTO W/O SCOPE: CPT | Performed by: ORTHOPAEDIC SURGERY

## 2018-06-21 PROCEDURE — 36415 COLL VENOUS BLD VENIPUNCTURE: CPT

## 2018-06-21 PROCEDURE — 93010 ELECTROCARDIOGRAM REPORT: CPT | Performed by: INTERNAL MEDICINE

## 2018-06-21 RX ORDER — CHLORHEXIDINE GLUCONATE 500 MG/1
CLOTH TOPICAL
Status: ON HOLD | COMMUNITY
End: 2018-06-29

## 2018-06-21 ASSESSMENT — HOOS JR
HOOS JR SCORE: 11
HOOS JR SCORE: 55.985

## 2018-06-21 NOTE — DISCHARGE INSTRUCTIONS
2% CHLORAHEXIDINE GLUCONATE* CLOTH  Preparing or “prepping” skin before surgery can reduce the risk of infection at the surgical site. To make the process easier, [facility] has chosen disposable cloths moistened with a rinse-free, 2% Chlorhexidine Gluconate (CHG) antiseptic solution. The steps below outline the prepping process and should be carefully followed.        Use the prep cloth on the area that is circled in the diagram             Directions Night before Surgery  1) Shower using a fresh bar of anti-bacterial soap (such as Dial) and clean washcloth.  Use a clean towel to completely dry your skin.  2) Do not use any lotions, oils or creams on your skin.  3) Open the package and remove 1 cloth, wipe your skin for 30 seconds in a circular motion.  Allow to dry for 3 minutes.  4) Repeat #3 with second cloth.  5) Do not touch your eyes, ears, or mouth with the prep cloth.  6) Allow the wet prep solution to air dry.  7) Discard the prep cloth and wash your hands with soap and water.   8) Dress in clean bed clothes and sleep on fresh clean bed sheets.   9) You may experience some temporary itching after the prep.    Directions Day of Surgery  1) Repeat steps 1,2,3,4,5,6,7, and 9.   2) Dress in clean clothes before coming to the hospital.    BACTROBAN NASAL OINTMENT  There are many germs normally in your nose. Bactroban is an ointment that will help reduce these germs. Please follow these instructions for Bactroban use:      ____The day before surgery in the morning  Date________    ____The day before surgery in the evening              Date________    ____The day of surgery in the morning    Date________    **Squirt ½ package of Bactroban Ointment onto a cotton applicator and apply to inside of 1st nostril.  Squirt the remaining Bactroban and apply to the inside of the other nostril.      Take the following medications the morning of surgery with a small sip of water:        General  Instructions:  • Do not eat solid food after midnight the night before surgery.  • You may drink clear liquids day of surgery but must stop at least one hour before your hospital arrival time.  • It is beneficial for you to have a clear drink that contains carbohydrates the day of surgery.  We suggest a 12 to 20 ounce bottle of Gatorade or Powerade for non-diabetic patients or a 12 to 20 ounce bottle of G2 or Powerade Zero for diabetic patients. (Pediatric patients, are not advised to drink a 12 to 20 ounce carbohydrate drink)    Clear liquids are liquids you can see through.  Nothing red in color.     Plain water                               Sports drinks  Sodas                                   Gelatin (Jell-O)  Fruit juices without pulp such as white grape juice and apple juice  Popsicles that contain no fruit or yogurt  Tea or coffee (no cream or milk added)  Gatorade / Powerade  G2 / Powerade Zero    • Infants may have breast milk up to four hours before surgery.  • Infants drinking formula may drink formula up to six hours before surgery.   • Patients who avoid smoking, chewing tobacco and alcohol for 4 weeks prior to surgery have a reduced risk of post-operative complications.  Quit smoking as many days before surgery as you can.  • Do not smoke, use chewing tobacco or drink alcohol the day of surgery.   • If applicable bring your C-PAP/ BI-PAP machine.  • Bring any papers given to you in the doctor’s office.  • Wear clean comfortable clothes and socks.  • Do not wear contact lenses or make-up.  Bring a case for your glasses.   • Bring crutches or walker if applicable.  • Remove all piercings.  Leave jewelry and any other valuables at home.  • Hair extensions with metal clips must be removed prior to surgery.  • The Pre-Admission Testing nurse will instruct you to bring medications if unable to obtain an accurate list in Pre-Admission Testing.            Preventing a Surgical Site Infection:  • For 2 to 3  days before surgery, avoid shaving with a razor because the razor can irritate skin and make it easier to develop an infection.    • Any areas of open skin can increase the risk of a post-operative wound infection by allowing bacteria to enter and travel throughout the body.  Notify your surgeon if you have any skin wounds / rashes even if it is not near the expected surgical site.  The area will need assessed to determine if surgery should be delayed until it is healed.  • The night prior to surgery sleep in a clean bed with clean clothing.  Do not allow pets to sleep with you.  • Shower on the morning of surgery using a fresh bar of anti-bacterial soap (such as Dial) and clean washcloth.  Dry with a clean towel and dress in clean clothing.  • Ask your surgeon if you will be receiving antibiotics prior to surgery.  • Make sure you, your family, and all healthcare providers clean their hands with soap and water or an alcohol based hand  before caring for you or your wound.    Day of surgery:  Upon arrival, a Pre-op nurse and Anesthesiologist will review your health history, obtain vital signs, and answer questions you may have.  The only belongings needed at this time will be your home medications and if applicable your C-PAP/BI-PAP machine.  If you are staying overnight your family can leave the rest of your belongings in the car and bring them to your room later.  A Pre-op nurse will start an IV and you may receive medication in preparation for surgery, including something to help you relax.  Your family will be able to see you in the Pre-op area.  While you are in surgery your family should notify the waiting room  if they leave the waiting room area and provide a contact phone number.    Please be aware that surgery does come with discomfort.  We want to make every effort to control your discomfort so please discuss any uncontrolled symptoms with your nurse.   Your doctor will most likely  have prescribed pain medications.      If you are going home after surgery you will receive individualized written care instructions before being discharged.  A responsible adult must drive you to and from the hospital on the day of your surgery and stay with you for 24 hours.    If you are staying overnight following surgery, you will be transported to your hospital room following the recovery period.  [facility] has all private rooms.    If you have any questions please call Pre-Admission Testing at [phone].  Deductibles and co-payments are collected on the day of service. Please be prepared to pay the required co-pay, deductible or deposit on the day of service as defined by your plan.

## 2018-06-29 ENCOUNTER — ANESTHESIA (OUTPATIENT)
Dept: PERIOP | Facility: HOSPITAL | Age: 68
End: 2018-06-29

## 2018-06-29 ENCOUNTER — HOSPITAL ENCOUNTER (INPATIENT)
Facility: HOSPITAL | Age: 68
LOS: 1 days | Discharge: HOME HEALTH | End: 2018-06-30
Attending: ORTHOPAEDIC SURGERY | Admitting: ORTHOPAEDIC SURGERY

## 2018-06-29 ENCOUNTER — ANESTHESIA EVENT (OUTPATIENT)
Dept: PERIOP | Facility: HOSPITAL | Age: 68
End: 2018-06-29

## 2018-06-29 ENCOUNTER — APPOINTMENT (OUTPATIENT)
Dept: GENERAL RADIOLOGY | Facility: HOSPITAL | Age: 68
End: 2018-06-29

## 2018-06-29 DIAGNOSIS — M16.12 PRIMARY OSTEOARTHRITIS OF LEFT HIP: ICD-10-CM

## 2018-06-29 DIAGNOSIS — R26.2 DIFFICULTY WALKING: Primary | ICD-10-CM

## 2018-06-29 PROBLEM — I10 HYPERTENSION: Status: ACTIVE | Noted: 2018-06-29

## 2018-06-29 PROBLEM — E11.9 TYPE 2 DIABETES MELLITUS (HCC): Status: ACTIVE | Noted: 2018-06-29

## 2018-06-29 LAB
GLUCOSE BLDC GLUCOMTR-MCNC: 156 MG/DL (ref 70–130)
GLUCOSE BLDC GLUCOMTR-MCNC: 166 MG/DL (ref 70–130)
GLUCOSE BLDC GLUCOMTR-MCNC: 172 MG/DL (ref 70–130)
GLUCOSE BLDC GLUCOMTR-MCNC: 186 MG/DL (ref 70–130)
GLUCOSE BLDC GLUCOMTR-MCNC: 211 MG/DL (ref 70–130)

## 2018-06-29 PROCEDURE — 25010000003 CEFAZOLIN IN DEXTROSE 2-4 GM/100ML-% SOLUTION: Performed by: ORTHOPAEDIC SURGERY

## 2018-06-29 PROCEDURE — 97110 THERAPEUTIC EXERCISES: CPT

## 2018-06-29 PROCEDURE — 25010000002 PROPOFOL 10 MG/ML EMULSION: Performed by: NURSE ANESTHETIST, CERTIFIED REGISTERED

## 2018-06-29 PROCEDURE — C1776 JOINT DEVICE (IMPLANTABLE): HCPCS | Performed by: ORTHOPAEDIC SURGERY

## 2018-06-29 PROCEDURE — 25010000002 HYDROMORPHONE PER 4 MG: Performed by: NURSE ANESTHETIST, CERTIFIED REGISTERED

## 2018-06-29 PROCEDURE — 25010000002 SUCCINYLCHOLINE PER 20 MG: Performed by: NURSE ANESTHETIST, CERTIFIED REGISTERED

## 2018-06-29 PROCEDURE — 97161 PT EVAL LOW COMPLEX 20 MIN: CPT

## 2018-06-29 PROCEDURE — 73501 X-RAY EXAM HIP UNI 1 VIEW: CPT

## 2018-06-29 PROCEDURE — 25010000002 KETOROLAC TROMETHAMINE PER 15 MG: Performed by: ORTHOPAEDIC SURGERY

## 2018-06-29 PROCEDURE — 25010000002 ONDANSETRON PER 1 MG: Performed by: NURSE ANESTHETIST, CERTIFIED REGISTERED

## 2018-06-29 PROCEDURE — 63710000001 INSULIN ASPART PER 5 UNITS: Performed by: INTERNAL MEDICINE

## 2018-06-29 PROCEDURE — 25010000002 FENTANYL CITRATE (PF) 100 MCG/2ML SOLUTION: Performed by: NURSE ANESTHETIST, CERTIFIED REGISTERED

## 2018-06-29 PROCEDURE — 25010000002 MIDAZOLAM PER 1 MG: Performed by: ANESTHESIOLOGY

## 2018-06-29 PROCEDURE — 0SRB04A REPLACEMENT OF LEFT HIP JOINT WITH CERAMIC ON POLYETHYLENE SYNTHETIC SUBSTITUTE, UNCEMENTED, OPEN APPROACH: ICD-10-PCS | Performed by: ORTHOPAEDIC SURGERY

## 2018-06-29 PROCEDURE — 82962 GLUCOSE BLOOD TEST: CPT

## 2018-06-29 PROCEDURE — 76000 FLUOROSCOPY <1 HR PHYS/QHP: CPT

## 2018-06-29 PROCEDURE — C1713 ANCHOR/SCREW BN/BN,TIS/BN: HCPCS | Performed by: ORTHOPAEDIC SURGERY

## 2018-06-29 PROCEDURE — 25010000002 VANCOMYCIN PER 500 MG: Performed by: NURSE ANESTHETIST, CERTIFIED REGISTERED

## 2018-06-29 PROCEDURE — 25010000002 ROPIVACAINE PER 1 MG: Performed by: ORTHOPAEDIC SURGERY

## 2018-06-29 PROCEDURE — 25010000002 CLONIDINE PER 1 MG: Performed by: ORTHOPAEDIC SURGERY

## 2018-06-29 DEVICE — TOTL HIP GRIPTION CUP DEPUY UPCHRG: Type: IMPLANTABLE DEVICE | Status: FUNCTIONAL

## 2018-06-29 DEVICE — PINNACLE CANCELLOUS BONE SCREW 6.5MM X 35MM
Type: IMPLANTABLE DEVICE | Status: FUNCTIONAL
Brand: PINNACLE

## 2018-06-29 DEVICE — PINNACLE HIP SOLUTIONS ALTRX POLYETHYLENE ACETABULAR LINER NEUTRAL 36MM ID 52MM OD
Type: IMPLANTABLE DEVICE | Status: FUNCTIONAL
Brand: PINNACLE ALTRX

## 2018-06-29 DEVICE — TOTL HIP COA DEPUY 9641334: Type: IMPLANTABLE DEVICE | Status: FUNCTIONAL

## 2018-06-29 DEVICE — ACTIS DUOFIX HIP PROSTHESIS (FEMORAL STEM 12/14 TAPER CEMENTLESS SIZE 6 HIGH COLLAR)  CE
Type: IMPLANTABLE DEVICE | Status: FUNCTIONAL
Brand: ACTIS

## 2018-06-29 DEVICE — TOTL HIP STEM DEPUY UPCHRG: Type: IMPLANTABLE DEVICE | Status: FUNCTIONAL

## 2018-06-29 DEVICE — BIOLOX DELTA CERAMIC FEMORAL HEAD +5.0 36MM DIA 12/14 TAPER
Type: IMPLANTABLE DEVICE | Status: FUNCTIONAL
Brand: BIOLOX DELTA

## 2018-06-29 DEVICE — PINNACLE GRIPTION ACETABULAR SHELL SECTOR 52MM OD
Type: IMPLANTABLE DEVICE | Status: FUNCTIONAL
Brand: PINNACLE GRIPTION

## 2018-06-29 RX ORDER — METFORMIN HYDROCHLORIDE 500 MG/1
1000 TABLET, EXTENDED RELEASE ORAL
Status: DISCONTINUED | OUTPATIENT
Start: 2018-06-30 | End: 2018-06-30 | Stop reason: HOSPADM

## 2018-06-29 RX ORDER — ONDANSETRON 4 MG/1
4 TABLET, ORALLY DISINTEGRATING ORAL EVERY 6 HOURS PRN
Status: DISCONTINUED | OUTPATIENT
Start: 2018-06-29 | End: 2018-06-30 | Stop reason: HOSPADM

## 2018-06-29 RX ORDER — FENTANYL CITRATE 50 UG/ML
INJECTION, SOLUTION INTRAMUSCULAR; INTRAVENOUS AS NEEDED
Status: DISCONTINUED | OUTPATIENT
Start: 2018-06-29 | End: 2018-06-29 | Stop reason: SURG

## 2018-06-29 RX ORDER — NALOXONE HCL 0.4 MG/ML
0.1 VIAL (ML) INJECTION
Status: DISCONTINUED | OUTPATIENT
Start: 2018-06-29 | End: 2018-06-30 | Stop reason: HOSPADM

## 2018-06-29 RX ORDER — UREA 10 %
1 LOTION (ML) TOPICAL NIGHTLY PRN
Status: DISCONTINUED | OUTPATIENT
Start: 2018-06-29 | End: 2018-06-30 | Stop reason: HOSPADM

## 2018-06-29 RX ORDER — SENNA AND DOCUSATE SODIUM 50; 8.6 MG/1; MG/1
2 TABLET, FILM COATED ORAL 2 TIMES DAILY PRN
Status: DISCONTINUED | OUTPATIENT
Start: 2018-06-29 | End: 2018-06-30 | Stop reason: HOSPADM

## 2018-06-29 RX ORDER — ACETAMINOPHEN 325 MG/1
325 TABLET ORAL EVERY 4 HOURS PRN
Status: DISCONTINUED | OUTPATIENT
Start: 2018-06-29 | End: 2018-06-30 | Stop reason: HOSPADM

## 2018-06-29 RX ORDER — SODIUM CHLORIDE, SODIUM LACTATE, POTASSIUM CHLORIDE, CALCIUM CHLORIDE 600; 310; 30; 20 MG/100ML; MG/100ML; MG/100ML; MG/100ML
9 INJECTION, SOLUTION INTRAVENOUS CONTINUOUS
Status: DISCONTINUED | OUTPATIENT
Start: 2018-06-29 | End: 2018-06-29 | Stop reason: HOSPADM

## 2018-06-29 RX ORDER — CEFAZOLIN SODIUM 2 G/100ML
2 INJECTION, SOLUTION INTRAVENOUS EVERY 8 HOURS
Status: COMPLETED | OUTPATIENT
Start: 2018-06-29 | End: 2018-06-29

## 2018-06-29 RX ORDER — HYDROMORPHONE HCL 110MG/55ML
PATIENT CONTROLLED ANALGESIA SYRINGE INTRAVENOUS AS NEEDED
Status: DISCONTINUED | OUTPATIENT
Start: 2018-06-29 | End: 2018-06-29 | Stop reason: SURG

## 2018-06-29 RX ORDER — PROMETHAZINE HYDROCHLORIDE 25 MG/ML
12.5 INJECTION, SOLUTION INTRAMUSCULAR; INTRAVENOUS ONCE AS NEEDED
Status: DISCONTINUED | OUTPATIENT
Start: 2018-06-29 | End: 2018-06-29 | Stop reason: HOSPADM

## 2018-06-29 RX ORDER — DEXTROSE MONOHYDRATE 25 G/50ML
25 INJECTION, SOLUTION INTRAVENOUS
Status: DISCONTINUED | OUTPATIENT
Start: 2018-06-29 | End: 2018-06-30 | Stop reason: HOSPADM

## 2018-06-29 RX ORDER — ONDANSETRON 4 MG/1
4 TABLET, FILM COATED ORAL EVERY 6 HOURS PRN
Status: DISCONTINUED | OUTPATIENT
Start: 2018-06-29 | End: 2018-06-30 | Stop reason: HOSPADM

## 2018-06-29 RX ORDER — ROCURONIUM BROMIDE 10 MG/ML
INJECTION, SOLUTION INTRAVENOUS AS NEEDED
Status: DISCONTINUED | OUTPATIENT
Start: 2018-06-29 | End: 2018-06-29 | Stop reason: SURG

## 2018-06-29 RX ORDER — HYDRALAZINE HYDROCHLORIDE 20 MG/ML
5 INJECTION INTRAMUSCULAR; INTRAVENOUS
Status: DISCONTINUED | OUTPATIENT
Start: 2018-06-29 | End: 2018-06-29 | Stop reason: HOSPADM

## 2018-06-29 RX ORDER — SUCCINYLCHOLINE CHLORIDE 20 MG/ML
INJECTION INTRAMUSCULAR; INTRAVENOUS AS NEEDED
Status: DISCONTINUED | OUTPATIENT
Start: 2018-06-29 | End: 2018-06-29 | Stop reason: SURG

## 2018-06-29 RX ORDER — MIDAZOLAM HYDROCHLORIDE 1 MG/ML
1 INJECTION INTRAMUSCULAR; INTRAVENOUS
Status: DISCONTINUED | OUTPATIENT
Start: 2018-06-29 | End: 2018-06-29 | Stop reason: HOSPADM

## 2018-06-29 RX ORDER — BISACODYL 10 MG
10 SUPPOSITORY, RECTAL RECTAL DAILY PRN
Status: DISCONTINUED | OUTPATIENT
Start: 2018-06-29 | End: 2018-06-30 | Stop reason: HOSPADM

## 2018-06-29 RX ORDER — LABETALOL HYDROCHLORIDE 5 MG/ML
5 INJECTION, SOLUTION INTRAVENOUS
Status: DISCONTINUED | OUTPATIENT
Start: 2018-06-29 | End: 2018-06-29 | Stop reason: HOSPADM

## 2018-06-29 RX ORDER — LIDOCAINE HYDROCHLORIDE 20 MG/ML
INJECTION, SOLUTION INFILTRATION; PERINEURAL AS NEEDED
Status: DISCONTINUED | OUTPATIENT
Start: 2018-06-29 | End: 2018-06-29 | Stop reason: SURG

## 2018-06-29 RX ORDER — METFORMIN HYDROCHLORIDE 500 MG/1
1000 TABLET, EXTENDED RELEASE ORAL
COMMUNITY
End: 2020-02-25

## 2018-06-29 RX ORDER — PROMETHAZINE HYDROCHLORIDE 25 MG/1
12.5 TABLET ORAL ONCE AS NEEDED
Status: DISCONTINUED | OUTPATIENT
Start: 2018-06-29 | End: 2018-06-29 | Stop reason: HOSPADM

## 2018-06-29 RX ORDER — LIDOCAINE HYDROCHLORIDE 10 MG/ML
0.5 INJECTION, SOLUTION EPIDURAL; INFILTRATION; INTRACAUDAL; PERINEURAL ONCE AS NEEDED
Status: DISCONTINUED | OUTPATIENT
Start: 2018-06-29 | End: 2018-06-29 | Stop reason: HOSPADM

## 2018-06-29 RX ORDER — LISINOPRIL 20 MG/1
20 TABLET ORAL DAILY
Status: DISCONTINUED | OUTPATIENT
Start: 2018-06-29 | End: 2018-06-30 | Stop reason: HOSPADM

## 2018-06-29 RX ORDER — EPHEDRINE SULFATE 50 MG/ML
5 INJECTION, SOLUTION INTRAVENOUS ONCE AS NEEDED
Status: DISCONTINUED | OUTPATIENT
Start: 2018-06-29 | End: 2018-06-29 | Stop reason: HOSPADM

## 2018-06-29 RX ORDER — PROMETHAZINE HYDROCHLORIDE 25 MG/1
25 SUPPOSITORY RECTAL ONCE AS NEEDED
Status: DISCONTINUED | OUTPATIENT
Start: 2018-06-29 | End: 2018-06-29 | Stop reason: HOSPADM

## 2018-06-29 RX ORDER — HYDROCODONE BITARTRATE AND ACETAMINOPHEN 7.5; 325 MG/1; MG/1
1 TABLET ORAL ONCE AS NEEDED
Status: DISCONTINUED | OUTPATIENT
Start: 2018-06-29 | End: 2018-06-29 | Stop reason: HOSPADM

## 2018-06-29 RX ORDER — DIPHENHYDRAMINE HYDROCHLORIDE 50 MG/ML
12.5 INJECTION INTRAMUSCULAR; INTRAVENOUS
Status: DISCONTINUED | OUTPATIENT
Start: 2018-06-29 | End: 2018-06-29 | Stop reason: HOSPADM

## 2018-06-29 RX ORDER — SODIUM CHLORIDE 9 MG/ML
100 INJECTION, SOLUTION INTRAVENOUS CONTINUOUS
Status: ACTIVE | OUTPATIENT
Start: 2018-06-29 | End: 2018-06-30

## 2018-06-29 RX ORDER — PROPOFOL 10 MG/ML
VIAL (ML) INTRAVENOUS AS NEEDED
Status: DISCONTINUED | OUTPATIENT
Start: 2018-06-29 | End: 2018-06-29 | Stop reason: SURG

## 2018-06-29 RX ORDER — FLUMAZENIL 0.1 MG/ML
0.2 INJECTION INTRAVENOUS AS NEEDED
Status: DISCONTINUED | OUTPATIENT
Start: 2018-06-29 | End: 2018-06-29 | Stop reason: HOSPADM

## 2018-06-29 RX ORDER — CEFAZOLIN SODIUM 2 G/100ML
2 INJECTION, SOLUTION INTRAVENOUS ONCE
Status: DISCONTINUED | OUTPATIENT
Start: 2018-06-29 | End: 2018-06-29 | Stop reason: HOSPADM

## 2018-06-29 RX ORDER — SODIUM CHLORIDE 0.9 % (FLUSH) 0.9 %
1-10 SYRINGE (ML) INJECTION AS NEEDED
Status: DISCONTINUED | OUTPATIENT
Start: 2018-06-29 | End: 2018-06-29 | Stop reason: HOSPADM

## 2018-06-29 RX ORDER — NALOXONE HCL 0.4 MG/ML
0.2 VIAL (ML) INJECTION AS NEEDED
Status: DISCONTINUED | OUTPATIENT
Start: 2018-06-29 | End: 2018-06-29 | Stop reason: HOSPADM

## 2018-06-29 RX ORDER — FENTANYL CITRATE 50 UG/ML
50 INJECTION, SOLUTION INTRAMUSCULAR; INTRAVENOUS
Status: DISCONTINUED | OUTPATIENT
Start: 2018-06-29 | End: 2018-06-29 | Stop reason: HOSPADM

## 2018-06-29 RX ORDER — CEFAZOLIN SODIUM 2 G/100ML
2 INJECTION, SOLUTION INTRAVENOUS ONCE
Status: COMPLETED | OUTPATIENT
Start: 2018-06-29 | End: 2018-06-29

## 2018-06-29 RX ORDER — MIDAZOLAM HYDROCHLORIDE 1 MG/ML
2 INJECTION INTRAMUSCULAR; INTRAVENOUS
Status: DISCONTINUED | OUTPATIENT
Start: 2018-06-29 | End: 2018-06-29 | Stop reason: HOSPADM

## 2018-06-29 RX ORDER — ONDANSETRON 2 MG/ML
4 INJECTION INTRAMUSCULAR; INTRAVENOUS ONCE AS NEEDED
Status: DISCONTINUED | OUTPATIENT
Start: 2018-06-29 | End: 2018-06-29 | Stop reason: HOSPADM

## 2018-06-29 RX ORDER — OXYCODONE HCL 10 MG/1
10 TABLET, FILM COATED, EXTENDED RELEASE ORAL ONCE
Status: COMPLETED | OUTPATIENT
Start: 2018-06-29 | End: 2018-06-29

## 2018-06-29 RX ORDER — TRANEXAMIC ACID 100 MG/ML
INJECTION, SOLUTION INTRAVENOUS AS NEEDED
Status: DISCONTINUED | OUTPATIENT
Start: 2018-06-29 | End: 2018-06-29 | Stop reason: SURG

## 2018-06-29 RX ORDER — ONDANSETRON 2 MG/ML
INJECTION INTRAMUSCULAR; INTRAVENOUS AS NEEDED
Status: DISCONTINUED | OUTPATIENT
Start: 2018-06-29 | End: 2018-06-29 | Stop reason: SURG

## 2018-06-29 RX ORDER — BISACODYL 5 MG/1
10 TABLET, DELAYED RELEASE ORAL DAILY PRN
Status: DISCONTINUED | OUTPATIENT
Start: 2018-06-29 | End: 2018-06-30 | Stop reason: HOSPADM

## 2018-06-29 RX ORDER — PROMETHAZINE HYDROCHLORIDE 25 MG/1
25 TABLET ORAL ONCE AS NEEDED
Status: DISCONTINUED | OUTPATIENT
Start: 2018-06-29 | End: 2018-06-29 | Stop reason: HOSPADM

## 2018-06-29 RX ORDER — ACETAMINOPHEN 500 MG
1000 TABLET ORAL ONCE
Status: COMPLETED | OUTPATIENT
Start: 2018-06-29 | End: 2018-06-29

## 2018-06-29 RX ORDER — HYDROCODONE BITARTRATE AND ACETAMINOPHEN 7.5; 325 MG/1; MG/1
1 TABLET ORAL EVERY 4 HOURS PRN
Status: DISCONTINUED | OUTPATIENT
Start: 2018-06-29 | End: 2018-06-30 | Stop reason: HOSPADM

## 2018-06-29 RX ORDER — HYDROCODONE BITARTRATE AND ACETAMINOPHEN 7.5; 325 MG/1; MG/1
2 TABLET ORAL EVERY 4 HOURS PRN
Status: DISCONTINUED | OUTPATIENT
Start: 2018-06-29 | End: 2018-06-30 | Stop reason: HOSPADM

## 2018-06-29 RX ORDER — OXYCODONE AND ACETAMINOPHEN 7.5; 325 MG/1; MG/1
1 TABLET ORAL ONCE AS NEEDED
Status: DISCONTINUED | OUTPATIENT
Start: 2018-06-29 | End: 2018-06-29 | Stop reason: HOSPADM

## 2018-06-29 RX ORDER — DOCUSATE SODIUM 100 MG/1
100 CAPSULE, LIQUID FILLED ORAL 2 TIMES DAILY PRN
Status: DISCONTINUED | OUTPATIENT
Start: 2018-06-29 | End: 2018-06-30 | Stop reason: HOSPADM

## 2018-06-29 RX ORDER — NICOTINE POLACRILEX 4 MG
15 LOZENGE BUCCAL
Status: DISCONTINUED | OUTPATIENT
Start: 2018-06-29 | End: 2018-06-30 | Stop reason: HOSPADM

## 2018-06-29 RX ORDER — FAMOTIDINE 10 MG/ML
20 INJECTION, SOLUTION INTRAVENOUS ONCE
Status: COMPLETED | OUTPATIENT
Start: 2018-06-29 | End: 2018-06-29

## 2018-06-29 RX ORDER — ONDANSETRON 2 MG/ML
4 INJECTION INTRAMUSCULAR; INTRAVENOUS EVERY 6 HOURS PRN
Status: DISCONTINUED | OUTPATIENT
Start: 2018-06-29 | End: 2018-06-30 | Stop reason: HOSPADM

## 2018-06-29 RX ORDER — ASPIRIN 325 MG
325 TABLET, DELAYED RELEASE (ENTERIC COATED) ORAL EVERY 12 HOURS SCHEDULED
Status: DISCONTINUED | OUTPATIENT
Start: 2018-06-30 | End: 2018-06-30 | Stop reason: HOSPADM

## 2018-06-29 RX ORDER — PREGABALIN 75 MG/1
75 CAPSULE ORAL ONCE
Status: COMPLETED | OUTPATIENT
Start: 2018-06-29 | End: 2018-06-29

## 2018-06-29 RX ADMIN — SODIUM CHLORIDE, POTASSIUM CHLORIDE, SODIUM LACTATE AND CALCIUM CHLORIDE: 600; 310; 30; 20 INJECTION, SOLUTION INTRAVENOUS at 11:15

## 2018-06-29 RX ADMIN — MIDAZOLAM 1 MG: 1 INJECTION INTRAMUSCULAR; INTRAVENOUS at 08:38

## 2018-06-29 RX ADMIN — INSULIN ASPART 13 UNITS: 100 INJECTION, SOLUTION INTRAVENOUS; SUBCUTANEOUS at 18:57

## 2018-06-29 RX ADMIN — HYDROCODONE BITARTRATE AND ACETAMINOPHEN 1 TABLET: 7.5; 325 TABLET ORAL at 15:12

## 2018-06-29 RX ADMIN — CEFAZOLIN SODIUM 2 G: 2 INJECTION, SOLUTION INTRAVENOUS at 16:38

## 2018-06-29 RX ADMIN — ACETAMINOPHEN 1000 MG: 500 TABLET, FILM COATED ORAL at 07:24

## 2018-06-29 RX ADMIN — SUCCINYLCHOLINE CHLORIDE 140 MG: 20 INJECTION, SOLUTION INTRAMUSCULAR; INTRAVENOUS; PARENTERAL at 09:24

## 2018-06-29 RX ADMIN — ROCURONIUM BROMIDE 10 MG: 10 INJECTION INTRAVENOUS at 09:24

## 2018-06-29 RX ADMIN — FAMOTIDINE 20 MG: 10 INJECTION, SOLUTION INTRAVENOUS at 08:38

## 2018-06-29 RX ADMIN — HYDROMORPHONE HYDROCHLORIDE 0.5 MG: 2 INJECTION INTRAMUSCULAR; INTRAVENOUS; SUBCUTANEOUS at 09:51

## 2018-06-29 RX ADMIN — VANCOMYCIN HYDROCHLORIDE 1 G: 1 INJECTION, POWDER, LYOPHILIZED, FOR SOLUTION INTRAVENOUS at 09:44

## 2018-06-29 RX ADMIN — FENTANYL CITRATE 50 MCG: 50 INJECTION INTRAMUSCULAR; INTRAVENOUS at 09:57

## 2018-06-29 RX ADMIN — FENTANYL CITRATE 50 MCG: 50 INJECTION, SOLUTION INTRAMUSCULAR; INTRAVENOUS at 12:53

## 2018-06-29 RX ADMIN — FENTANYL CITRATE 50 MCG: 50 INJECTION, SOLUTION INTRAMUSCULAR; INTRAVENOUS at 12:15

## 2018-06-29 RX ADMIN — OXYCODONE HYDROCHLORIDE 10 MG: 10 TABLET, FILM COATED, EXTENDED RELEASE ORAL at 07:24

## 2018-06-29 RX ADMIN — FENTANYL CITRATE 50 MCG: 50 INJECTION INTRAMUSCULAR; INTRAVENOUS at 09:28

## 2018-06-29 RX ADMIN — CEFAZOLIN SODIUM 2 G: 2 INJECTION, SOLUTION INTRAVENOUS at 22:42

## 2018-06-29 RX ADMIN — HYDROMORPHONE HYDROCHLORIDE 0.5 MG: 2 INJECTION INTRAMUSCULAR; INTRAVENOUS; SUBCUTANEOUS at 10:12

## 2018-06-29 RX ADMIN — DOCUSATE SODIUM 100 MG: 100 CAPSULE, LIQUID FILLED ORAL at 20:59

## 2018-06-29 RX ADMIN — INSULIN ASPART 2 UNITS: 100 INJECTION, SOLUTION INTRAVENOUS; SUBCUTANEOUS at 18:57

## 2018-06-29 RX ADMIN — TRANEXAMIC ACID 1000 MG: 100 INJECTION, SOLUTION INTRAVENOUS at 09:37

## 2018-06-29 RX ADMIN — ONDANSETRON 4 MG: 2 INJECTION INTRAMUSCULAR; INTRAVENOUS at 11:08

## 2018-06-29 RX ADMIN — LISINOPRIL 20 MG: 20 TABLET ORAL at 16:38

## 2018-06-29 RX ADMIN — LIDOCAINE HYDROCHLORIDE 50 MG: 20 INJECTION, SOLUTION INFILTRATION; PERINEURAL at 09:24

## 2018-06-29 RX ADMIN — ROCURONIUM BROMIDE 40 MG: 10 INJECTION INTRAVENOUS at 09:30

## 2018-06-29 RX ADMIN — SODIUM CHLORIDE, POTASSIUM CHLORIDE, SODIUM LACTATE AND CALCIUM CHLORIDE: 600; 310; 30; 20 INJECTION, SOLUTION INTRAVENOUS at 09:20

## 2018-06-29 RX ADMIN — Medication 1 MG: at 22:42

## 2018-06-29 RX ADMIN — HYDROCODONE BITARTRATE AND ACETAMINOPHEN 2 TABLET: 7.5; 325 TABLET ORAL at 20:58

## 2018-06-29 RX ADMIN — SUGAMMADEX 220 MG: 100 INJECTION, SOLUTION INTRAVENOUS at 11:24

## 2018-06-29 RX ADMIN — PREGABALIN 75 MG: 75 CAPSULE ORAL at 07:24

## 2018-06-29 RX ADMIN — ROCURONIUM BROMIDE 20 MG: 10 INJECTION INTRAVENOUS at 09:53

## 2018-06-29 RX ADMIN — CEFAZOLIN SODIUM 2 G: 2 INJECTION, SOLUTION INTRAVENOUS at 09:21

## 2018-06-29 RX ADMIN — SODIUM CHLORIDE, POTASSIUM CHLORIDE, SODIUM LACTATE AND CALCIUM CHLORIDE 9 ML/HR: 600; 310; 30; 20 INJECTION, SOLUTION INTRAVENOUS at 08:38

## 2018-06-29 RX ADMIN — FENTANYL CITRATE 100 MCG: 50 INJECTION INTRAMUSCULAR; INTRAVENOUS at 09:45

## 2018-06-29 RX ADMIN — PROPOFOL 200 MG: 10 INJECTION, EMULSION INTRAVENOUS at 09:24

## 2018-06-29 NOTE — ANESTHESIA PREPROCEDURE EVALUATION
Anesthesia Evaluation     Patient summary reviewed and Nursing notes reviewed   NPO Solid Status: > 8 hours  NPO Liquid Status: > 2 hours           Airway   Mallampati: II  no difficulty expected  Comment: Recessed chin  Dental - normal exam     Pulmonary     breath sounds clear to auscultation  (+) a smoker Former,   Cardiovascular     ECG reviewed  Rhythm: regular  Rate: normal    (+) hypertension, hyperlipidemia,       Neuro/Psych  GI/Hepatic/Renal/Endo    (+)   diabetes mellitus type 1 using insulin,     Musculoskeletal     Abdominal    Substance History      OB/GYN          Other                        Anesthesia Plan    ASA 3     general     intravenous and inhalational induction   Anesthetic plan and risks discussed with patient.

## 2018-06-29 NOTE — ANESTHESIA POSTPROCEDURE EVALUATION
Patient: Vicente Cook    Procedure Summary     Date:  06/29/18 Room / Location:  [facility] OR [address] [place] [address] MAIN OR    Anesthesia Start:  0920 Anesthesia Stop:  1135    Procedure:  LT TOTAL HIP ARTHROPLASTY ANTERIOR WITH HANA TABLE (Left Hip) Diagnosis:       Primary osteoarthritis of left hip      (Primary osteoarthritis of left hip [M16.12])    Surgeon:  Sharif Ruiz MD Provider:  Roger Kemp MD    Anesthesia Type:  general ASA Status:  3          Anesthesia Type: general  Last vitals  BP   145/89 (06/29/18 1210)   Temp   37.2 °C (98.9 °F) (06/29/18 1133)   Pulse   87 (06/29/18 1210)   Resp   14 (06/29/18 1210)     SpO2   96 % (06/29/18 1210)     Post Anesthesia Care and Evaluation    Patient location during evaluation: PACU  Patient participation: complete - patient participated  Level of consciousness: awake  Pain management: adequate  Airway patency: patent  Anesthetic complications: No anesthetic complications    Cardiovascular status: acceptable  Respiratory status: acceptable  Hydration status: acceptable

## 2018-06-29 NOTE — ANESTHESIA PROCEDURE NOTES
Airway  Urgency: elective    Airway not difficult    General Information and Staff    Patient location during procedure: OR  Anesthesiologist: ARTHUR GOODMAN  CRNA: MATT STEWARD    Indications and Patient Condition  Indications for airway management: airway protection    Preoxygenated: yes  MILS maintained throughout  Mask difficulty assessment: 1 - vent by mask    Final Airway Details  Final airway type: endotracheal airway      Successful airway: ETT  Cuffed: yes   Successful intubation technique: direct laryngoscopy  Facilitating devices/methods: anterior pressure/BURP and intubating stylet  Endotracheal tube insertion site: oral  Blade: Alex  Blade size: #2  ETT size: 8.0 mm  Cormack-Lehane Classification: grade IIa - partial view of glottis  Placement verified by: chest auscultation and capnometry   Cuff volume (mL): 8  Measured from: lips  ETT to lips (cm): 24  Number of attempts at approach: 2    Additional Comments  Pt preoxygenated, SIVI, bag mask vent, ATETI, dentition as before

## 2018-06-30 VITALS
RESPIRATION RATE: 16 BRPM | HEIGHT: 73 IN | TEMPERATURE: 98.3 F | SYSTOLIC BLOOD PRESSURE: 125 MMHG | BODY MASS INDEX: 31.48 KG/M2 | DIASTOLIC BLOOD PRESSURE: 60 MMHG | OXYGEN SATURATION: 94 % | HEART RATE: 85 BPM | WEIGHT: 237.5 LBS

## 2018-06-30 PROBLEM — M16.12 PRIMARY OSTEOARTHRITIS OF LEFT HIP: Status: RESOLVED | Noted: 2018-06-18 | Resolved: 2018-06-30

## 2018-06-30 LAB
ANION GAP SERPL CALCULATED.3IONS-SCNC: 12.8 MMOL/L
BASOPHILS # BLD AUTO: 0.02 10*3/MM3 (ref 0–0.2)
BASOPHILS NFR BLD AUTO: 0.2 % (ref 0–1.5)
BUN BLD-MCNC: 13 MG/DL (ref 8–23)
BUN/CREAT SERPL: 13.4 (ref 7–25)
CALCIUM SPEC-SCNC: 8 MG/DL (ref 8.6–10.5)
CHLORIDE SERPL-SCNC: 98 MMOL/L (ref 98–107)
CO2 SERPL-SCNC: 24.2 MMOL/L (ref 22–29)
CREAT BLD-MCNC: 0.97 MG/DL (ref 0.76–1.27)
DEPRECATED RDW RBC AUTO: 44.5 FL (ref 37–54)
EOSINOPHIL # BLD AUTO: 0.05 10*3/MM3 (ref 0–0.7)
EOSINOPHIL NFR BLD AUTO: 0.5 % (ref 0.3–6.2)
ERYTHROCYTE [DISTWIDTH] IN BLOOD BY AUTOMATED COUNT: 13.2 % (ref 11.5–14.5)
GFR SERPL CREATININE-BSD FRML MDRD: 77 ML/MIN/1.73
GLUCOSE BLD-MCNC: 152 MG/DL (ref 65–99)
GLUCOSE BLDC GLUCOMTR-MCNC: 171 MG/DL (ref 70–130)
HCT VFR BLD AUTO: 33.4 % (ref 40.4–52.2)
HGB BLD-MCNC: 11 G/DL (ref 13.7–17.6)
IMM GRANULOCYTES # BLD: 0 10*3/MM3 (ref 0–0.03)
IMM GRANULOCYTES NFR BLD: 0 % (ref 0–0.5)
LYMPHOCYTES # BLD AUTO: 1.53 10*3/MM3 (ref 0.9–4.8)
LYMPHOCYTES NFR BLD AUTO: 15.4 % (ref 19.6–45.3)
MCH RBC QN AUTO: 30.6 PG (ref 27–32.7)
MCHC RBC AUTO-ENTMCNC: 32.9 G/DL (ref 32.6–36.4)
MCV RBC AUTO: 93 FL (ref 79.8–96.2)
MONOCYTES # BLD AUTO: 1.14 10*3/MM3 (ref 0.2–1.2)
MONOCYTES NFR BLD AUTO: 11.5 % (ref 5–12)
NEUTROPHILS # BLD AUTO: 7.21 10*3/MM3 (ref 1.9–8.1)
NEUTROPHILS NFR BLD AUTO: 72.4 % (ref 42.7–76)
PLATELET # BLD AUTO: 169 10*3/MM3 (ref 140–500)
PMV BLD AUTO: 10.2 FL (ref 6–12)
POTASSIUM BLD-SCNC: 4.2 MMOL/L (ref 3.5–5.2)
RBC # BLD AUTO: 3.59 10*6/MM3 (ref 4.6–6)
SODIUM BLD-SCNC: 135 MMOL/L (ref 136–145)
WBC NRBC COR # BLD: 9.95 10*3/MM3 (ref 4.5–10.7)

## 2018-06-30 PROCEDURE — 97110 THERAPEUTIC EXERCISES: CPT

## 2018-06-30 PROCEDURE — 80048 BASIC METABOLIC PNL TOTAL CA: CPT | Performed by: ORTHOPAEDIC SURGERY

## 2018-06-30 PROCEDURE — 85025 COMPLETE CBC W/AUTO DIFF WBC: CPT | Performed by: ORTHOPAEDIC SURGERY

## 2018-06-30 PROCEDURE — 82962 GLUCOSE BLOOD TEST: CPT

## 2018-06-30 PROCEDURE — 25010000002 ONDANSETRON PER 1 MG: Performed by: ORTHOPAEDIC SURGERY

## 2018-06-30 RX ORDER — HYDROCODONE BITARTRATE AND ACETAMINOPHEN 7.5; 325 MG/1; MG/1
1 TABLET ORAL EVERY 4 HOURS PRN
Qty: 60 TABLET | Refills: 0 | Status: SHIPPED | OUTPATIENT
Start: 2018-06-30 | End: 2018-07-09

## 2018-06-30 RX ORDER — PSEUDOEPHEDRINE HCL 30 MG
100 TABLET ORAL 2 TIMES DAILY PRN
Qty: 30 CAPSULE | Refills: 0 | Status: SHIPPED | OUTPATIENT
Start: 2018-06-30 | End: 2018-07-14

## 2018-06-30 RX ORDER — ONDANSETRON 4 MG/1
4 TABLET, FILM COATED ORAL EVERY 6 HOURS PRN
Qty: 20 TABLET | Refills: 0 | Status: SHIPPED | OUTPATIENT
Start: 2018-06-30 | End: 2020-02-25

## 2018-06-30 RX ADMIN — HYDROCODONE BITARTRATE AND ACETAMINOPHEN 2 TABLET: 7.5; 325 TABLET ORAL at 01:07

## 2018-06-30 RX ADMIN — ASPIRIN 325 MG: 325 TABLET, DELAYED RELEASE ORAL at 08:17

## 2018-06-30 RX ADMIN — HYDROCODONE BITARTRATE AND ACETAMINOPHEN 2 TABLET: 7.5; 325 TABLET ORAL at 09:54

## 2018-06-30 RX ADMIN — LISINOPRIL 20 MG: 20 TABLET ORAL at 08:17

## 2018-06-30 RX ADMIN — ONDANSETRON 4 MG: 2 INJECTION, SOLUTION INTRAMUSCULAR; INTRAVENOUS at 08:17

## 2018-06-30 RX ADMIN — HYDROCODONE BITARTRATE AND ACETAMINOPHEN 2 TABLET: 7.5; 325 TABLET ORAL at 05:34

## 2018-06-30 RX ADMIN — METFORMIN HYDROCHLORIDE 1000 MG: 500 TABLET, EXTENDED RELEASE ORAL at 08:17

## 2019-10-08 NOTE — TELEPHONE ENCOUNTER
ED f/u phone call. States he feels better, and is taking prescribed meds, although he stopped taking Protonix, as it caused dizziness. No other questions/concerns   101/fahrenheit 5

## 2019-12-23 ENCOUNTER — TRANSCRIBE ORDERS (OUTPATIENT)
Dept: ADMINISTRATIVE | Facility: HOSPITAL | Age: 69
End: 2019-12-23

## 2019-12-23 DIAGNOSIS — M25.551 RIGHT HIP PAIN: Primary | ICD-10-CM

## 2020-02-24 ENCOUNTER — PREP FOR SURGERY (OUTPATIENT)
Dept: OTHER | Facility: HOSPITAL | Age: 70
End: 2020-02-24

## 2020-02-24 DIAGNOSIS — M16.11 PRIMARY OSTEOARTHRITIS OF RIGHT HIP: Primary | ICD-10-CM

## 2020-02-24 RX ORDER — OXYCODONE HCL 10 MG/1
10 TABLET, FILM COATED, EXTENDED RELEASE ORAL ONCE
Status: CANCELLED | OUTPATIENT
Start: 2020-03-03 | End: 2020-02-24

## 2020-02-24 RX ORDER — ACETAMINOPHEN 500 MG
1000 TABLET ORAL ONCE
Status: CANCELLED | OUTPATIENT
Start: 2020-03-03 | End: 2020-02-24

## 2020-02-24 RX ORDER — CEFAZOLIN SODIUM 2 G/100ML
2 INJECTION, SOLUTION INTRAVENOUS ONCE
Status: CANCELLED | OUTPATIENT
Start: 2020-03-03 | End: 2020-02-24

## 2020-02-24 NOTE — H&P
KALYANI JEFF is a 69 year old male who comes in today for progressively worsening pain in the right hip which has been present on and off for several months.    He has a known history of osteoarthritis.  He was advised, meloxicam, but this is not helping.  The patient denies an injury.  The patient is not on pain meds.  The patient states that he is having a dull, achy and with occasioanl sharp pain pain which he rates at a 7-8 on a scale from 1-10.  The pain occurs intermittently.  The pain is located in the front of the hip. Positive groin pain.  The pain occurs on the right side only.  The pain is radiating from the hip to the knee.  The patient states that rest makes it better, while activity makes it worse.  He has problems getting in and out of car, putting his shoes and socks on, and going from a seated to standing position.  He takes ibuprofen as needed for pain and discomfort.  He states this helped some.  He has been trying to increase his activity in the gym over the last several months to stay healthy.  He states he has been having to hold off from these exercises due to pain.  He is known to me from a LEFT total hip arthroplasty in the summer of 2018.  He is doing very well with the LEFT hip.  Denies any pain or problems.  He is ambulatory without the help of an assistive device.  He is here alone the office today.  He denies any history of MRSA, DVT.  Denies any history of heart problems.  He has a history of diabetes mellitus type 2.  Review of Systems:  Positive for: Joint Pain.    Patient denies: Abdominal Pain, Bleeding, Chest Pain, Convulsions/Seizure, Decreased Motion, Depression, Difficulty Swallowing, Easy Bruisability, Emotional Disturbances, Eyes or Vision Problems, Fecal Incontinence, Fever/Chills, Headaches, Increased Thirst, Increased Hunger, Insomnia, Nausea/Vomiting, Night Sweats, Poor Balance, Persistent Cough, Rash, Shortness of Breath, Shortness of Breath While Lying down, Skin Problems,  Urinary Retention and Weakness.  Allergies:  * no known allergies  Medications:  * androgel   * lispro   * senior multi vitamin   * aspirin 81 mg oral tablet (aspirin) (aspirin) ; route: oral  lisinopril 20 mg oral tablet (lisinopril) ; route: oral  atorvastatin calcium 80 mg oral tablet (atorvastatin calcium) ; route: oral  tresiba flextouch 200 unit/ml subcutaneous solution pen-injector (insulin degludec) ; route: subcutaneous  Patient History of:  HYPERTENSION  BLOOD CLOTS/EMBOLISM - NEGATIVE  DIABETES - TYPE 2  Surgical History:  LT HIP SX-   LT ANKLE-   Known Family History of:  diabetes-sibling  Past medical, social, family histories and ROS reviewed today with the patient and changes documented in the chart (02/24/2020).  PCP Dr. PRETTY GIL, JAYRO    Physical Exam  Height:  73 in.    Weight:  235 lbs.     BMI:  31.12  Pulse:  92  Blood pressure:  140 / 78 mm Hg    Gait: antalgic               Ambulation: patient ambulates without assistive devices      Mental/HEENT/Cardio/Skin  The patient's general appearance was well nourished, well hydrated, no acute distress.  Orientation was alert and oritented x 3.  The patient's mood was normal.  A head exam revealed normocephalic/atraumatic.  An eye exam revealed pupils equal.  Pulmonary exam shows normal air exchange, no labored breathing, or shortness of breath.  A skin exam showed normal temperature and color in the area of examination.      Right Hip/Pelvis    Normal:  Neurovascular status is intact.  Sensation in hip is normal.  DP Pulse is 3+.  PT PULSE is 3+.  Capillary Refill is normal.  Reflexes are normal.    ROM  Internal Rotation: <30  External Rotation: <40  Abduction: <45  Adduction: <15  Flexion: <100  Extension: <5    Tenderness  Location: groin  Radiation of Pain: anterior thigh  Pain w/ Palpation: none  Gabriel Test: negative  Impingement: negative  Straight Leg Raise: negative    Strength  Quad: normal  Abduction: normal  Adduction:  normal  Flexion: normal  Extension: normal  Positive Stinchfield sign.  Positive Trendelenburg sign.  Attempted movements of the LEFT hip are painful and restricted    Left Hip/Pelvis          Imaging/Diagnostic Studies  X-rays from the previous office visit, have been reviewed with the patient has severe degenerative osteoarthritis of the RIGHT hip with bone-on-bone and acetabular and femoral head osteophytes.  Impression  Right hip primary osteoarthritis ([id number])    Plan  Options and alternatives were discussed in detail with the patient.   The patient has reached the point of disability and failed nonoperative management.   The patient is indicated for a RIGHT total hip replacement .   The likely Risks and benefits of the procedure including but not limited to infection, DVT, pulmonary embolism, recurrent dislocation, Leg length discrepancy, periprosthetic fractures, possibility of injury to nerves or vessels, tendons, possibility of morbidity and mortality likely medical risks for stroke and heart attack, have been discussed in detail.   Despite the risks involved the patient would like to proceed. The patient is being scheduled for a RIGHT Total HIP arthroplasty by DIRECT ANTERIOR APPROACH at [facility] on March 3, 2020   I will request for Medical and cardiac clearance from Dr. PRETTY GIL, [place].   Postoperative DVT prophylaxis - Patient has no high risk factors Plan for ASPIRIN    Preoperative antibiotic prophylaxis - Plan for SCIP protocol with CEFAZOLIN weight based. Will give VANCOMYCIN in addition due to increased BMI.     Patient does not use tobacco.      We discussed the benefits of surgical intervention, as well as alternative treatments.  Potential surgical risks and complications include but are not limited to DVT, infection, neurovascular injury, fracture, implant wear, failure, possible need for revision surgery, loss of motion, dislocation, limb length changes.  Sufficient  opportunity was given to discuss the condition and treatment plan with the doctor, and all questions were answered for the patient.  Nonsurgical measures such as injections, medications, or physical therapy may not offer significant relief to this patient.  The discussion lasted 30 minutes.  VICENTE agreed to proceed with the surgery.      Vicente should follow up with THIEN PETER MD post op.

## 2020-02-25 ENCOUNTER — APPOINTMENT (OUTPATIENT)
Dept: PREADMISSION TESTING | Facility: HOSPITAL | Age: 70
End: 2020-02-25

## 2020-02-25 ENCOUNTER — HOSPITAL ENCOUNTER (OUTPATIENT)
Dept: GENERAL RADIOLOGY | Facility: HOSPITAL | Age: 70
Discharge: HOME | End: 2020-02-25
Admitting: ORTHOPAEDIC SURGERY

## 2020-02-25 ENCOUNTER — HOSPITAL ENCOUNTER (OUTPATIENT)
Dept: GENERAL RADIOLOGY | Facility: HOSPITAL | Age: 70
Discharge: HOME | End: 2020-02-25

## 2020-02-25 VITALS
DIASTOLIC BLOOD PRESSURE: 72 MMHG | TEMPERATURE: 98 F | HEIGHT: 73 IN | BODY MASS INDEX: 30.89 KG/M2 | SYSTOLIC BLOOD PRESSURE: 146 MMHG | WEIGHT: 233.1 LBS | RESPIRATION RATE: 16 BRPM | OXYGEN SATURATION: 92 % | HEART RATE: 81 BPM

## 2020-02-25 DIAGNOSIS — M16.11 PRIMARY OSTEOARTHRITIS OF RIGHT HIP: ICD-10-CM

## 2020-02-25 LAB
ALBUMIN SERPL-MCNC: 4.5 G/DL (ref 3.5–5.2)
ALBUMIN/GLOB SERPL: 1.7 G/DL
ALP SERPL-CCNC: 72 U/L (ref 39–117)
ALT SERPL W P-5'-P-CCNC: 32 U/L (ref 1–41)
ANION GAP SERPL CALCULATED.3IONS-SCNC: 13.7 MMOL/L (ref 5–15)
APTT PPP: 28.2 SECONDS (ref 22.7–35.4)
AST SERPL-CCNC: 25 U/L (ref 1–40)
BASOPHILS # BLD AUTO: 0.07 10*3/MM3 (ref 0–0.2)
BASOPHILS NFR BLD AUTO: 0.8 % (ref 0–1.5)
BILIRUB SERPL-MCNC: 0.6 MG/DL (ref 0.2–1.2)
BUN BLD-MCNC: 16 MG/DL (ref 8–23)
BUN/CREAT SERPL: 13.9 (ref 7–25)
CALCIUM SPEC-SCNC: 9.7 MG/DL (ref 8.6–10.5)
CHLORIDE SERPL-SCNC: 98 MMOL/L (ref 98–107)
CO2 SERPL-SCNC: 22.3 MMOL/L (ref 22–29)
CREAT BLD-MCNC: 1.15 MG/DL (ref 0.76–1.27)
DEPRECATED RDW RBC AUTO: 42.9 FL (ref 37–54)
EOSINOPHIL # BLD AUTO: 0.29 10*3/MM3 (ref 0–0.4)
EOSINOPHIL NFR BLD AUTO: 3.1 % (ref 0.3–6.2)
ERYTHROCYTE [DISTWIDTH] IN BLOOD BY AUTOMATED COUNT: 12.8 % (ref 12.3–15.4)
GFR SERPL CREATININE-BSD FRML MDRD: 63 ML/MIN/1.73
GLOBULIN UR ELPH-MCNC: 2.7 GM/DL
GLUCOSE BLD-MCNC: 133 MG/DL (ref 65–99)
HBA1C MFR BLD: 7.78 % (ref 4.8–5.6)
HCT VFR BLD AUTO: 46.3 % (ref 37.5–51)
HGB BLD-MCNC: 16.4 G/DL (ref 13–17.7)
IMM GRANULOCYTES # BLD AUTO: 0.05 10*3/MM3 (ref 0–0.05)
IMM GRANULOCYTES NFR BLD AUTO: 0.5 % (ref 0–0.5)
INR PPP: 0.99 (ref 0.9–1.1)
LYMPHOCYTES # BLD AUTO: 1.56 10*3/MM3 (ref 0.7–3.1)
LYMPHOCYTES NFR BLD AUTO: 16.8 % (ref 19.6–45.3)
MCH RBC QN AUTO: 32.7 PG (ref 26.6–33)
MCHC RBC AUTO-ENTMCNC: 35.4 G/DL (ref 31.5–35.7)
MCV RBC AUTO: 92.2 FL (ref 79–97)
MONOCYTES # BLD AUTO: 0.69 10*3/MM3 (ref 0.1–0.9)
MONOCYTES NFR BLD AUTO: 7.4 % (ref 5–12)
NEUTROPHILS # BLD AUTO: 6.64 10*3/MM3 (ref 1.7–7)
NEUTROPHILS NFR BLD AUTO: 71.4 % (ref 42.7–76)
NRBC BLD AUTO-RTO: 0 /100 WBC (ref 0–0.2)
PLATELET # BLD AUTO: 256 10*3/MM3 (ref 140–450)
PMV BLD AUTO: 9.7 FL (ref 6–12)
POTASSIUM BLD-SCNC: 4.1 MMOL/L (ref 3.5–5.2)
PROT SERPL-MCNC: 7.2 G/DL (ref 6–8.5)
PROTHROMBIN TIME: 12.8 SECONDS (ref 11.7–14.2)
RBC # BLD AUTO: 5.02 10*6/MM3 (ref 4.14–5.8)
SODIUM BLD-SCNC: 134 MMOL/L (ref 136–145)
WBC NRBC COR # BLD: 9.3 10*3/MM3 (ref 3.4–10.8)

## 2020-02-25 PROCEDURE — 85730 THROMBOPLASTIN TIME PARTIAL: CPT | Performed by: ORTHOPAEDIC SURGERY

## 2020-02-25 PROCEDURE — 71046 X-RAY EXAM CHEST 2 VIEWS: CPT

## 2020-02-25 PROCEDURE — 80053 COMPREHEN METABOLIC PANEL: CPT | Performed by: ORTHOPAEDIC SURGERY

## 2020-02-25 PROCEDURE — 93005 ELECTROCARDIOGRAM TRACING: CPT

## 2020-02-25 PROCEDURE — 85610 PROTHROMBIN TIME: CPT | Performed by: ORTHOPAEDIC SURGERY

## 2020-02-25 PROCEDURE — 93010 ELECTROCARDIOGRAM REPORT: CPT | Performed by: INTERNAL MEDICINE

## 2020-02-25 PROCEDURE — 36415 COLL VENOUS BLD VENIPUNCTURE: CPT

## 2020-02-25 PROCEDURE — 83036 HEMOGLOBIN GLYCOSYLATED A1C: CPT | Performed by: ORTHOPAEDIC SURGERY

## 2020-02-25 PROCEDURE — 85025 COMPLETE CBC W/AUTO DIFF WBC: CPT | Performed by: ORTHOPAEDIC SURGERY

## 2020-02-25 PROCEDURE — 73502 X-RAY EXAM HIP UNI 2-3 VIEWS: CPT

## 2020-02-25 RX ORDER — ASPIRIN 81 MG/1
81 TABLET ORAL DAILY
COMMUNITY
End: 2020-03-04 | Stop reason: HOSPADM

## 2020-02-25 RX ORDER — CETIRIZINE HYDROCHLORIDE 10 MG/1
10 TABLET ORAL DAILY
COMMUNITY

## 2020-02-25 RX ORDER — TESTOSTERONE 12.5 MG/1.25G
20 GEL TOPICAL DAILY
COMMUNITY
End: 2022-07-22

## 2020-02-25 RX ORDER — LISINOPRIL AND HYDROCHLOROTHIAZIDE 25; 20 MG/1; MG/1
1 TABLET ORAL DAILY
COMMUNITY

## 2020-02-25 ASSESSMENT — HOOS JR
HOOS JR SCORE: 11
HOOS JR SCORE: 55.985

## 2020-02-25 NOTE — DISCHARGE INSTRUCTIONS
ARRIVAL TIME 07:30        General Instructions:  • Do not eat solid food after midnight the night before surgery.  • You may drink clear liquids day of surgery but must stop at least one hour before your hospital arrival time.  • It is beneficial for you to have a clear drink that contains carbohydrates the day of surgery.  We suggest a 12 to 20 ounce bottle of Gatorade or Powerade for non-diabetic patients or a 12 to 20 ounce bottle of G2 or Powerade Zero for diabetic patients. (Pediatric patients, are not advised to drink a 12 to 20 ounce carbohydrate drink)    Clear liquids are liquids you can see through.  Nothing red in color.     Plain water                               Sports drinks  Sodas                                   Gelatin (Jell-O)  Fruit juices without pulp such as white grape juice and apple juice  Popsicles that contain no fruit or yogurt  Tea or coffee (no cream or milk added)  Gatorade / Powerade  G2 / Powerade Zero    • Infants may have breast milk up to four hours before surgery.  • Infants drinking formula may drink formula up to six hours before surgery.   • Patients who avoid smoking, chewing tobacco and alcohol for 4 weeks prior to surgery have a reduced risk of post-operative complications.  Quit smoking as many days before surgery as you can.  • Do not smoke, use chewing tobacco or drink alcohol the day of surgery.   • If applicable bring your C-PAP/ BI-PAP machine.  • Bring any papers given to you in the doctor’s office.  • Wear clean comfortable clothes.  • Do not wear contact lenses, false eyelashes or make-up.  Bring a case for your glasses.   • Bring crutches or walker if applicable.  • Remove all piercings.  Leave jewelry and any other valuables at home.  • Hair extensions with metal clips must be removed prior to surgery.  • The Pre-Admission Testing nurse will instruct you to bring medications if unable to obtain an accurate list in Pre-Admission Testing.        If you were given  a blood bank ID arm band remember to bring it with you the day of surgery.    Preventing a Surgical Site Infection:  • For 2 to 3 days before surgery, avoid shaving with a razor because the razor can irritate skin and make it easier to develop an infection.    • Any areas of open skin can increase the risk of a post-operative wound infection by allowing bacteria to enter and travel throughout the body.  Notify your surgeon if you have any skin wounds / rashes even if it is not near the expected surgical site.  The area will need assessed to determine if surgery should be delayed until it is healed.  • The night prior to surgery shower using a fresh bar of anti-bacterial soap (such as Dial) and clean washcloth.  Sleep in a clean bed with clean clothing.  Do not allow pets to sleep with you.  • Shower on the morning of surgery using a fresh bar of anti-bacterial soap (such as Dial) and clean washcloth.  Dry with a clean towel and dress in clean clothing.  • Ask your surgeon if you will be receiving antibiotics prior to surgery.  • Make sure you, your family, and all healthcare providers clean their hands with soap and water or an alcohol based hand  before caring for you or your wound.    Day of surgery:  Your arrival time is approximately two hours before your scheduled surgery time.  Upon arrival, a Pre-op nurse and Anesthesiologist will review your health history, obtain vital signs, and answer questions you may have.  The only belongings needed at this time will be a list of your home medications and if applicable your C-PAP/BI-PAP machine.  If you are staying overnight your family can leave the rest of your belongings in the car and bring them to your room later.  A Pre-op nurse will start an IV and you may receive medication in preparation for surgery, including something to help you relax.  Your family will be able to see you in the Pre-op area.  Two visitors at a time will be allowed in the Pre-op  room.  While you are in surgery your family should notify the waiting room  if they leave the waiting room area and provide a contact phone number.    Please be aware that surgery does come with discomfort.  We want to make every effort to control your discomfort so please discuss any uncontrolled symptoms with your nurse.   Your doctor will most likely have prescribed pain medications.      If you are going home after surgery you will receive individualized written care instructions before being discharged.  A responsible adult must drive you to and from the hospital on the day of your surgery and stay with you for 24 hours.    If you are staying overnight following surgery, you will be transported to your hospital room following the recovery period.  [facility] has all private rooms.    If you have any questions please call Pre-Admission Testing at [phone].  Deductibles and co-payments are collected on the day of service. Please be prepared to pay the required co-pay, deductible or deposit on the day of service as defined by your plan.CHLORHEXIDINE CLOTH INSTRUCTIONS  The morning of surgery follow these instructions using the Chlorhexidine cloths you've been given.  These steps reduce bacteria on the body.  Do not use the cloths near your eyes, ears mouth, genitalia or on open wounds.  Throw the cloths away after use but do not try to flush them down a toilet.      • Open and remove one cloth at a time from the package.    • Leave the cloth unfolded and begin the bathing.  • Massage the skin with the cloths using gentle pressure to remove bacteria.  Do not scrub harshly.   • Follow the steps below with one 2% CHG cloth per area (6 total cloths).  • One cloth for neck, shoulders and chest.  • One cloth for both arms, hands, fingers and underarms (do underarms last).  • One cloth for the abdomen followed by groin.  • One cloth for right leg and foot including between the toes.  • One  cloth for left leg and foot including between the toes.  • The last cloth is to be used for the back of the neck, back and buttocks.    Allow the CHG to air dry 3 minutes on the skin which will give it time to work and decrease the chance of irritation.  The skin may feel sticky until it is dry.  Do not rinse with water or any other liquid or you will lose the beneficial effects of the CHG.  If mild skin irritation occurs, do rinse the skin to remove the CHG.  Report this to the nurse at time of admission.  Do not apply lotions, creams, ointments, deodorants or perfumes after using the clothes. Dress in clean clothes before coming to the hospital.    BACTROBAN NASAL OINTMENT  There are many germs normally in your nose. Bactroban is an ointment that will help reduce these germs. Please follow these instructions for Bactroban use:      ____The day before surgery in the morning  Date___3/2_____    ____The day before surgery in the evening              Date___3/2_____    ____The day of surgery in the morning    Date___3/3_____    **Squirt ½ package of Bactroban Ointment onto a cotton applicator and apply to inside of 1st nostril.  Squirt the remaining Bactroban and apply to the inside of the other nostril.

## 2020-03-03 ENCOUNTER — APPOINTMENT (OUTPATIENT)
Dept: GENERAL RADIOLOGY | Facility: HOSPITAL | Age: 70
End: 2020-03-03

## 2020-03-03 ENCOUNTER — HOSPITAL ENCOUNTER (INPATIENT)
Facility: HOSPITAL | Age: 70
LOS: 1 days | Discharge: HOME HEALTH | End: 2020-03-04
Attending: ORTHOPAEDIC SURGERY | Admitting: ORTHOPAEDIC SURGERY

## 2020-03-03 ENCOUNTER — ANESTHESIA EVENT (OUTPATIENT)
Dept: PERIOP | Facility: HOSPITAL | Age: 70
End: 2020-03-03

## 2020-03-03 ENCOUNTER — ANESTHESIA (OUTPATIENT)
Dept: PERIOP | Facility: HOSPITAL | Age: 70
End: 2020-03-03

## 2020-03-03 DIAGNOSIS — Z96.641 STATUS POST RIGHT HIP REPLACEMENT: Primary | ICD-10-CM

## 2020-03-03 DIAGNOSIS — M16.11 PRIMARY OSTEOARTHRITIS OF RIGHT HIP: ICD-10-CM

## 2020-03-03 PROBLEM — I71.40 AAA (ABDOMINAL AORTIC ANEURYSM) WITHOUT RUPTURE: Status: ACTIVE | Noted: 2020-03-03

## 2020-03-03 PROBLEM — G47.33 OSA (OBSTRUCTIVE SLEEP APNEA): Status: ACTIVE | Noted: 2020-03-03

## 2020-03-03 LAB
GLUCOSE BLDC GLUCOMTR-MCNC: 165 MG/DL (ref 70–130)
GLUCOSE BLDC GLUCOMTR-MCNC: 171 MG/DL (ref 70–130)
GLUCOSE BLDC GLUCOMTR-MCNC: 180 MG/DL (ref 70–130)
GLUCOSE BLDC GLUCOMTR-MCNC: 191 MG/DL (ref 70–130)
GLUCOSE BLDC GLUCOMTR-MCNC: 215 MG/DL (ref 70–130)

## 2020-03-03 PROCEDURE — 25010000002 HYDROMORPHONE PER 4 MG: Performed by: NURSE ANESTHETIST, CERTIFIED REGISTERED

## 2020-03-03 PROCEDURE — 25010000002 PROMETHAZINE PER 50 MG: Performed by: NURSE ANESTHETIST, CERTIFIED REGISTERED

## 2020-03-03 PROCEDURE — C1776 JOINT DEVICE (IMPLANTABLE): HCPCS | Performed by: ORTHOPAEDIC SURGERY

## 2020-03-03 PROCEDURE — 25010000002 NEOSTIGMINE PER 0.5 MG: Performed by: NURSE ANESTHETIST, CERTIFIED REGISTERED

## 2020-03-03 PROCEDURE — 25010000002 FENTANYL CITRATE (PF) 100 MCG/2ML SOLUTION: Performed by: NURSE ANESTHETIST, CERTIFIED REGISTERED

## 2020-03-03 PROCEDURE — 73501 X-RAY EXAM HIP UNI 1 VIEW: CPT

## 2020-03-03 PROCEDURE — 25010000002 PROPOFOL 10 MG/ML EMULSION: Performed by: NURSE ANESTHETIST, CERTIFIED REGISTERED

## 2020-03-03 PROCEDURE — 25010000002 CLONIDINE PER 1 MG: Performed by: ORTHOPAEDIC SURGERY

## 2020-03-03 PROCEDURE — 0SR904A REPLACEMENT OF RIGHT HIP JOINT WITH CERAMIC ON POLYETHYLENE SYNTHETIC SUBSTITUTE, UNCEMENTED, OPEN APPROACH: ICD-10-PCS | Performed by: ORTHOPAEDIC SURGERY

## 2020-03-03 PROCEDURE — 25010000002 ROPIVACAINE PER 1 MG: Performed by: ORTHOPAEDIC SURGERY

## 2020-03-03 PROCEDURE — 82962 GLUCOSE BLOOD TEST: CPT

## 2020-03-03 PROCEDURE — 25010000003 CEFAZOLIN IN DEXTROSE 2-4 GM/100ML-% SOLUTION: Performed by: ORTHOPAEDIC SURGERY

## 2020-03-03 PROCEDURE — 63710000001 INSULIN LISPRO (HUMAN) PER 5 UNITS: Performed by: HOSPITALIST

## 2020-03-03 PROCEDURE — 97110 THERAPEUTIC EXERCISES: CPT

## 2020-03-03 PROCEDURE — 63710000001 INSULIN LISPRO (HUMAN) PER 5 UNITS: Performed by: ORTHOPAEDIC SURGERY

## 2020-03-03 PROCEDURE — 94799 UNLISTED PULMONARY SVC/PX: CPT

## 2020-03-03 PROCEDURE — 63710000001 INSULIN GLARGINE PER 5 UNITS: Performed by: HOSPITALIST

## 2020-03-03 PROCEDURE — 25010000002 DEXAMETHASONE PER 1 MG: Performed by: NURSE ANESTHETIST, CERTIFIED REGISTERED

## 2020-03-03 PROCEDURE — 25010000002 KETOROLAC TROMETHAMINE PER 15 MG: Performed by: ORTHOPAEDIC SURGERY

## 2020-03-03 PROCEDURE — 76000 FLUOROSCOPY <1 HR PHYS/QHP: CPT

## 2020-03-03 PROCEDURE — 25010000002 ONDANSETRON PER 1 MG: Performed by: NURSE ANESTHETIST, CERTIFIED REGISTERED

## 2020-03-03 PROCEDURE — 25010000002 PHENYLEPHRINE PER 1 ML: Performed by: NURSE ANESTHETIST, CERTIFIED REGISTERED

## 2020-03-03 PROCEDURE — 25010000002 VANCOMYCIN 10 G RECONSTITUTED SOLUTION: Performed by: ORTHOPAEDIC SURGERY

## 2020-03-03 PROCEDURE — 97162 PT EVAL MOD COMPLEX 30 MIN: CPT

## 2020-03-03 PROCEDURE — 63710000001 ONDANSETRON PER 8 MG: Performed by: ORTHOPAEDIC SURGERY

## 2020-03-03 DEVICE — ACTIS DUOFIX HIP PROSTHESIS (FEMORAL STEM 12/14 TAPER CEMENTLESS SIZE 7 HIGH COLLAR)  CE
Type: IMPLANTABLE DEVICE | Site: TROCHANTER | Status: FUNCTIONAL
Brand: ACTIS

## 2020-03-03 DEVICE — BIOLOX DELTA CERAMIC FEMORAL HEAD +5.0 36MM DIA 12/14 TAPER
Type: IMPLANTABLE DEVICE | Site: ACETABULUM | Status: FUNCTIONAL
Brand: BIOLOX DELTA

## 2020-03-03 DEVICE — PINNACLE HIP SOLUTIONS ALTRX POLYETHYLENE ACETABULAR LINER NEUTRAL 36MM ID 52MM OD
Type: IMPLANTABLE DEVICE | Site: ACETABULUM | Status: FUNCTIONAL
Brand: PINNACLE ALTRX

## 2020-03-03 DEVICE — TOTL HIP COP DEPUY 9641334: Type: IMPLANTABLE DEVICE | Site: ACETABULUM | Status: FUNCTIONAL

## 2020-03-03 DEVICE — PINNACLE GRIPTION ACETABULAR SHELL SECTOR 52MM OD
Type: IMPLANTABLE DEVICE | Site: ACETABULUM | Status: FUNCTIONAL
Brand: PINNACLE GRIPTION

## 2020-03-03 RX ORDER — FENTANYL CITRATE 50 UG/ML
50 INJECTION, SOLUTION INTRAMUSCULAR; INTRAVENOUS
Status: DISCONTINUED | OUTPATIENT
Start: 2020-03-03 | End: 2020-03-03 | Stop reason: HOSPADM

## 2020-03-03 RX ORDER — NALOXONE HCL 0.4 MG/ML
0.2 VIAL (ML) INJECTION AS NEEDED
Status: DISCONTINUED | OUTPATIENT
Start: 2020-03-03 | End: 2020-03-03 | Stop reason: HOSPADM

## 2020-03-03 RX ORDER — INSULIN GLARGINE 100 [IU]/ML
30 INJECTION, SOLUTION SUBCUTANEOUS NIGHTLY
Status: DISCONTINUED | OUTPATIENT
Start: 2020-03-03 | End: 2020-03-04 | Stop reason: HOSPADM

## 2020-03-03 RX ORDER — ONDANSETRON 2 MG/ML
4 INJECTION INTRAMUSCULAR; INTRAVENOUS EVERY 6 HOURS PRN
Status: DISCONTINUED | OUTPATIENT
Start: 2020-03-03 | End: 2020-03-04 | Stop reason: HOSPADM

## 2020-03-03 RX ORDER — DOCUSATE SODIUM 100 MG/1
100 CAPSULE, LIQUID FILLED ORAL 2 TIMES DAILY PRN
Status: DISCONTINUED | OUTPATIENT
Start: 2020-03-03 | End: 2020-03-04 | Stop reason: HOSPADM

## 2020-03-03 RX ORDER — LANOLIN ALCOHOL/MO/W.PET/CERES
1000 CREAM (GRAM) TOPICAL DAILY
COMMUNITY
End: 2023-01-26

## 2020-03-03 RX ORDER — TRANEXAMIC ACID 100 MG/ML
INJECTION, SOLUTION INTRAVENOUS AS NEEDED
Status: DISCONTINUED | OUTPATIENT
Start: 2020-03-03 | End: 2020-03-03 | Stop reason: SURG

## 2020-03-03 RX ORDER — EPHEDRINE SULFATE 50 MG/ML
5 INJECTION, SOLUTION INTRAVENOUS ONCE AS NEEDED
Status: DISCONTINUED | OUTPATIENT
Start: 2020-03-03 | End: 2020-03-03 | Stop reason: HOSPADM

## 2020-03-03 RX ORDER — ACETAMINOPHEN 500 MG
1000 TABLET ORAL EVERY 8 HOURS
Status: DISCONTINUED | OUTPATIENT
Start: 2020-03-03 | End: 2020-03-04 | Stop reason: HOSPADM

## 2020-03-03 RX ORDER — HYDROCODONE BITARTRATE AND ACETAMINOPHEN 7.5; 325 MG/1; MG/1
1 TABLET ORAL EVERY 4 HOURS PRN
Status: DISCONTINUED | OUTPATIENT
Start: 2020-03-03 | End: 2020-03-04

## 2020-03-03 RX ORDER — OXYCODONE HCL 10 MG/1
10 TABLET, FILM COATED, EXTENDED RELEASE ORAL ONCE
Status: COMPLETED | OUTPATIENT
Start: 2020-03-03 | End: 2020-03-03

## 2020-03-03 RX ORDER — MAGNESIUM HYDROXIDE 1200 MG/15ML
LIQUID ORAL AS NEEDED
Status: DISCONTINUED | OUTPATIENT
Start: 2020-03-03 | End: 2020-03-03 | Stop reason: HOSPADM

## 2020-03-03 RX ORDER — HYDRALAZINE HYDROCHLORIDE 20 MG/ML
5 INJECTION INTRAMUSCULAR; INTRAVENOUS
Status: DISCONTINUED | OUTPATIENT
Start: 2020-03-03 | End: 2020-03-03 | Stop reason: HOSPADM

## 2020-03-03 RX ORDER — SODIUM CHLORIDE 0.9 % (FLUSH) 0.9 %
3-10 SYRINGE (ML) INJECTION AS NEEDED
Status: DISCONTINUED | OUTPATIENT
Start: 2020-03-03 | End: 2020-03-03 | Stop reason: HOSPADM

## 2020-03-03 RX ORDER — FENTANYL CITRATE 50 UG/ML
INJECTION, SOLUTION INTRAMUSCULAR; INTRAVENOUS AS NEEDED
Status: DISCONTINUED | OUTPATIENT
Start: 2020-03-03 | End: 2020-03-03 | Stop reason: SURG

## 2020-03-03 RX ORDER — HYDROMORPHONE HYDROCHLORIDE 1 MG/ML
0.5 INJECTION, SOLUTION INTRAMUSCULAR; INTRAVENOUS; SUBCUTANEOUS
Status: DISCONTINUED | OUTPATIENT
Start: 2020-03-03 | End: 2020-03-03 | Stop reason: HOSPADM

## 2020-03-03 RX ORDER — ONDANSETRON 2 MG/ML
INJECTION INTRAMUSCULAR; INTRAVENOUS AS NEEDED
Status: DISCONTINUED | OUTPATIENT
Start: 2020-03-03 | End: 2020-03-03 | Stop reason: SURG

## 2020-03-03 RX ORDER — CEFAZOLIN SODIUM 2 G/100ML
2 INJECTION, SOLUTION INTRAVENOUS EVERY 8 HOURS
Status: COMPLETED | OUTPATIENT
Start: 2020-03-03 | End: 2020-03-04

## 2020-03-03 RX ORDER — UREA 10 %
1 LOTION (ML) TOPICAL NIGHTLY PRN
Status: DISCONTINUED | OUTPATIENT
Start: 2020-03-03 | End: 2020-03-04 | Stop reason: HOSPADM

## 2020-03-03 RX ORDER — NALOXONE HCL 0.4 MG/ML
0.1 VIAL (ML) INJECTION
Status: DISCONTINUED | OUTPATIENT
Start: 2020-03-03 | End: 2020-03-04 | Stop reason: HOSPADM

## 2020-03-03 RX ORDER — SODIUM CHLORIDE, SODIUM LACTATE, POTASSIUM CHLORIDE, CALCIUM CHLORIDE 600; 310; 30; 20 MG/100ML; MG/100ML; MG/100ML; MG/100ML
9 INJECTION, SOLUTION INTRAVENOUS CONTINUOUS
Status: DISCONTINUED | OUTPATIENT
Start: 2020-03-03 | End: 2020-03-03 | Stop reason: HOSPADM

## 2020-03-03 RX ORDER — DEXAMETHASONE SODIUM PHOSPHATE 10 MG/ML
INJECTION INTRAMUSCULAR; INTRAVENOUS AS NEEDED
Status: DISCONTINUED | OUTPATIENT
Start: 2020-03-03 | End: 2020-03-03 | Stop reason: SURG

## 2020-03-03 RX ORDER — PROMETHAZINE HYDROCHLORIDE 25 MG/ML
12.5 INJECTION, SOLUTION INTRAMUSCULAR; INTRAVENOUS ONCE AS NEEDED
Status: DISCONTINUED | OUTPATIENT
Start: 2020-03-03 | End: 2020-03-03 | Stop reason: HOSPADM

## 2020-03-03 RX ORDER — ONDANSETRON 4 MG/1
4 TABLET, FILM COATED ORAL EVERY 6 HOURS PRN
Status: DISCONTINUED | OUTPATIENT
Start: 2020-03-03 | End: 2020-03-04 | Stop reason: HOSPADM

## 2020-03-03 RX ORDER — ACETAMINOPHEN 500 MG
1000 TABLET ORAL ONCE
Status: COMPLETED | OUTPATIENT
Start: 2020-03-03 | End: 2020-03-03

## 2020-03-03 RX ORDER — BISACODYL 5 MG/1
10 TABLET, DELAYED RELEASE ORAL DAILY PRN
Status: DISCONTINUED | OUTPATIENT
Start: 2020-03-03 | End: 2020-03-04 | Stop reason: HOSPADM

## 2020-03-03 RX ORDER — PROMETHAZINE HYDROCHLORIDE 25 MG/ML
6.25 INJECTION, SOLUTION INTRAMUSCULAR; INTRAVENOUS
Status: DISCONTINUED | OUTPATIENT
Start: 2020-03-03 | End: 2020-03-03 | Stop reason: HOSPADM

## 2020-03-03 RX ORDER — HYDROCODONE BITARTRATE AND ACETAMINOPHEN 7.5; 325 MG/1; MG/1
1 TABLET ORAL ONCE AS NEEDED
Status: DISCONTINUED | OUTPATIENT
Start: 2020-03-03 | End: 2020-03-03 | Stop reason: HOSPADM

## 2020-03-03 RX ORDER — OXYCODONE AND ACETAMINOPHEN 7.5; 325 MG/1; MG/1
1 TABLET ORAL ONCE AS NEEDED
Status: DISCONTINUED | OUTPATIENT
Start: 2020-03-03 | End: 2020-03-03 | Stop reason: HOSPADM

## 2020-03-03 RX ORDER — HYDROCODONE BITARTRATE AND ACETAMINOPHEN 7.5; 325 MG/1; MG/1
2 TABLET ORAL EVERY 4 HOURS PRN
Status: DISPENSED | OUTPATIENT
Start: 2020-03-03 | End: 2020-03-04

## 2020-03-03 RX ORDER — BISACODYL 10 MG
10 SUPPOSITORY, RECTAL RECTAL DAILY PRN
Status: DISCONTINUED | OUTPATIENT
Start: 2020-03-03 | End: 2020-03-04 | Stop reason: HOSPADM

## 2020-03-03 RX ORDER — HYDROMORPHONE HYDROCHLORIDE 1 MG/ML
0.5 INJECTION, SOLUTION INTRAMUSCULAR; INTRAVENOUS; SUBCUTANEOUS
Status: DISCONTINUED | OUTPATIENT
Start: 2020-03-03 | End: 2020-03-04 | Stop reason: HOSPADM

## 2020-03-03 RX ORDER — FLUMAZENIL 0.1 MG/ML
0.2 INJECTION INTRAVENOUS AS NEEDED
Status: DISCONTINUED | OUTPATIENT
Start: 2020-03-03 | End: 2020-03-03 | Stop reason: HOSPADM

## 2020-03-03 RX ORDER — SODIUM CHLORIDE 9 MG/ML
100 INJECTION, SOLUTION INTRAVENOUS CONTINUOUS
Status: DISCONTINUED | OUTPATIENT
Start: 2020-03-03 | End: 2020-03-04 | Stop reason: HOSPADM

## 2020-03-03 RX ORDER — LIDOCAINE HYDROCHLORIDE 20 MG/ML
INJECTION, SOLUTION INFILTRATION; PERINEURAL AS NEEDED
Status: DISCONTINUED | OUTPATIENT
Start: 2020-03-03 | End: 2020-03-03 | Stop reason: SURG

## 2020-03-03 RX ORDER — ROCURONIUM BROMIDE 10 MG/ML
INJECTION, SOLUTION INTRAVENOUS AS NEEDED
Status: DISCONTINUED | OUTPATIENT
Start: 2020-03-03 | End: 2020-03-03 | Stop reason: SURG

## 2020-03-03 RX ORDER — PROMETHAZINE HYDROCHLORIDE 25 MG/1
25 TABLET ORAL ONCE AS NEEDED
Status: DISCONTINUED | OUTPATIENT
Start: 2020-03-03 | End: 2020-03-03 | Stop reason: HOSPADM

## 2020-03-03 RX ORDER — PROPOFOL 10 MG/ML
VIAL (ML) INTRAVENOUS AS NEEDED
Status: DISCONTINUED | OUTPATIENT
Start: 2020-03-03 | End: 2020-03-03 | Stop reason: SURG

## 2020-03-03 RX ORDER — PROMETHAZINE HYDROCHLORIDE 25 MG/1
25 SUPPOSITORY RECTAL ONCE AS NEEDED
Status: DISCONTINUED | OUTPATIENT
Start: 2020-03-03 | End: 2020-03-03 | Stop reason: HOSPADM

## 2020-03-03 RX ORDER — ONDANSETRON 2 MG/ML
4 INJECTION INTRAMUSCULAR; INTRAVENOUS ONCE AS NEEDED
Status: DISCONTINUED | OUTPATIENT
Start: 2020-03-03 | End: 2020-03-03 | Stop reason: HOSPADM

## 2020-03-03 RX ORDER — LABETALOL HYDROCHLORIDE 5 MG/ML
5 INJECTION, SOLUTION INTRAVENOUS
Status: DISCONTINUED | OUTPATIENT
Start: 2020-03-03 | End: 2020-03-03 | Stop reason: HOSPADM

## 2020-03-03 RX ORDER — SODIUM CHLORIDE 0.9 % (FLUSH) 0.9 %
3 SYRINGE (ML) INJECTION EVERY 12 HOURS SCHEDULED
Status: DISCONTINUED | OUTPATIENT
Start: 2020-03-03 | End: 2020-03-03 | Stop reason: HOSPADM

## 2020-03-03 RX ORDER — ACETAMINOPHEN 325 MG/1
650 TABLET ORAL ONCE AS NEEDED
Status: DISCONTINUED | OUTPATIENT
Start: 2020-03-03 | End: 2020-03-03 | Stop reason: HOSPADM

## 2020-03-03 RX ORDER — DEXTROSE MONOHYDRATE 25 G/50ML
25 INJECTION, SOLUTION INTRAVENOUS
Status: DISCONTINUED | OUTPATIENT
Start: 2020-03-03 | End: 2020-03-04 | Stop reason: HOSPADM

## 2020-03-03 RX ORDER — NICOTINE POLACRILEX 4 MG
15 LOZENGE BUCCAL
Status: DISCONTINUED | OUTPATIENT
Start: 2020-03-03 | End: 2020-03-04 | Stop reason: HOSPADM

## 2020-03-03 RX ORDER — ASPIRIN 81 MG/1
81 TABLET ORAL EVERY 12 HOURS SCHEDULED
Status: DISCONTINUED | OUTPATIENT
Start: 2020-03-04 | End: 2020-03-04 | Stop reason: HOSPADM

## 2020-03-03 RX ORDER — LIDOCAINE HYDROCHLORIDE 10 MG/ML
0.5 INJECTION, SOLUTION EPIDURAL; INFILTRATION; INTRACAUDAL; PERINEURAL ONCE AS NEEDED
Status: DISCONTINUED | OUTPATIENT
Start: 2020-03-03 | End: 2020-03-03 | Stop reason: HOSPADM

## 2020-03-03 RX ORDER — DIPHENHYDRAMINE HYDROCHLORIDE 50 MG/ML
12.5 INJECTION INTRAMUSCULAR; INTRAVENOUS
Status: DISCONTINUED | OUTPATIENT
Start: 2020-03-03 | End: 2020-03-03 | Stop reason: HOSPADM

## 2020-03-03 RX ORDER — FAMOTIDINE 10 MG/ML
20 INJECTION, SOLUTION INTRAVENOUS ONCE
Status: COMPLETED | OUTPATIENT
Start: 2020-03-03 | End: 2020-03-03

## 2020-03-03 RX ORDER — CEFAZOLIN SODIUM 2 G/100ML
2 INJECTION, SOLUTION INTRAVENOUS ONCE
Status: COMPLETED | OUTPATIENT
Start: 2020-03-03 | End: 2020-03-03

## 2020-03-03 RX ORDER — DIPHENHYDRAMINE HCL 25 MG
25 CAPSULE ORAL
Status: DISCONTINUED | OUTPATIENT
Start: 2020-03-03 | End: 2020-03-03 | Stop reason: HOSPADM

## 2020-03-03 RX ORDER — GLYCOPYRROLATE 0.2 MG/ML
INJECTION INTRAMUSCULAR; INTRAVENOUS AS NEEDED
Status: DISCONTINUED | OUTPATIENT
Start: 2020-03-03 | End: 2020-03-03 | Stop reason: SURG

## 2020-03-03 RX ADMIN — ACETAMINOPHEN 1000 MG: 500 TABLET, FILM COATED ORAL at 06:53

## 2020-03-03 RX ADMIN — PROPOFOL 300 MG: 10 INJECTION, EMULSION INTRAVENOUS at 08:28

## 2020-03-03 RX ADMIN — ROCURONIUM BROMIDE 50 MG: 10 INJECTION, SOLUTION INTRAVENOUS at 08:28

## 2020-03-03 RX ADMIN — GLYCOPYRROLATE 0.4 MG: 0.2 INJECTION INTRAMUSCULAR; INTRAVENOUS at 10:16

## 2020-03-03 RX ADMIN — PHENYLEPHRINE HYDROCHLORIDE 100 MCG: 10 INJECTION INTRAVENOUS at 10:08

## 2020-03-03 RX ADMIN — INSULIN LISPRO 2 UNITS: 100 INJECTION, SOLUTION INTRAVENOUS; SUBCUTANEOUS at 17:21

## 2020-03-03 RX ADMIN — PHENYLEPHRINE HYDROCHLORIDE 100 MCG: 10 INJECTION INTRAVENOUS at 10:39

## 2020-03-03 RX ADMIN — PROMETHAZINE HYDROCHLORIDE 6.25 MG: 25 INJECTION INTRAMUSCULAR; INTRAVENOUS at 12:16

## 2020-03-03 RX ADMIN — SODIUM CHLORIDE, POTASSIUM CHLORIDE, SODIUM LACTATE AND CALCIUM CHLORIDE 9 ML/HR: 600; 310; 30; 20 INJECTION, SOLUTION INTRAVENOUS at 07:44

## 2020-03-03 RX ADMIN — INSULIN LISPRO 31.5 UNITS: 100 INJECTION, SOLUTION INTRAVENOUS; SUBCUTANEOUS at 13:39

## 2020-03-03 RX ADMIN — FENTANYL CITRATE 50 MCG: 50 INJECTION, SOLUTION INTRAMUSCULAR; INTRAVENOUS at 11:11

## 2020-03-03 RX ADMIN — FENTANYL CITRATE 50 MCG: 50 INJECTION, SOLUTION INTRAMUSCULAR; INTRAVENOUS at 11:20

## 2020-03-03 RX ADMIN — HYDROCODONE BITARTRATE AND ACETAMINOPHEN 1 TABLET: 7.5; 325 TABLET ORAL at 13:38

## 2020-03-03 RX ADMIN — HYDROMORPHONE HYDROCHLORIDE 0.5 MG: 1 INJECTION, SOLUTION INTRAMUSCULAR; INTRAVENOUS; SUBCUTANEOUS at 11:36

## 2020-03-03 RX ADMIN — DEXAMETHASONE SODIUM PHOSPHATE 8 MG: 10 INJECTION INTRAMUSCULAR; INTRAVENOUS at 08:32

## 2020-03-03 RX ADMIN — DOCUSATE SODIUM 100 MG: 100 CAPSULE, LIQUID FILLED ORAL at 21:29

## 2020-03-03 RX ADMIN — ONDANSETRON HYDROCHLORIDE 4 MG: 4 TABLET, FILM COATED ORAL at 23:58

## 2020-03-03 RX ADMIN — PHENYLEPHRINE HYDROCHLORIDE 100 MCG: 10 INJECTION INTRAVENOUS at 10:28

## 2020-03-03 RX ADMIN — LISINOPRIL: 10 TABLET ORAL at 15:04

## 2020-03-03 RX ADMIN — FENTANYL CITRATE 50 MCG: 50 INJECTION INTRAMUSCULAR; INTRAVENOUS at 09:05

## 2020-03-03 RX ADMIN — ROCURONIUM BROMIDE 10 MG: 10 INJECTION, SOLUTION INTRAVENOUS at 09:41

## 2020-03-03 RX ADMIN — CEFAZOLIN SODIUM 2 G: 2 INJECTION, SOLUTION INTRAVENOUS at 15:02

## 2020-03-03 RX ADMIN — OXYCODONE HYDROCHLORIDE 10 MG: 10 TABLET, FILM COATED, EXTENDED RELEASE ORAL at 06:53

## 2020-03-03 RX ADMIN — HYDROMORPHONE HYDROCHLORIDE 0.5 MG: 1 INJECTION, SOLUTION INTRAMUSCULAR; INTRAVENOUS; SUBCUTANEOUS at 12:14

## 2020-03-03 RX ADMIN — SODIUM CHLORIDE 100 ML/HR: 9 INJECTION, SOLUTION INTRAVENOUS at 13:39

## 2020-03-03 RX ADMIN — INSULIN LISPRO 31.5 UNITS: 100 INJECTION, SOLUTION INTRAVENOUS; SUBCUTANEOUS at 17:20

## 2020-03-03 RX ADMIN — SODIUM CHLORIDE, POTASSIUM CHLORIDE, SODIUM LACTATE AND CALCIUM CHLORIDE: 600; 310; 30; 20 INJECTION, SOLUTION INTRAVENOUS at 10:22

## 2020-03-03 RX ADMIN — INSULIN GLARGINE 30 UNITS: 100 INJECTION, SOLUTION SUBCUTANEOUS at 21:32

## 2020-03-03 RX ADMIN — TRANEXAMIC ACID 1000 MG: 100 INJECTION, SOLUTION INTRAVENOUS at 08:55

## 2020-03-03 RX ADMIN — CEFAZOLIN SODIUM 2 G: 2 INJECTION, SOLUTION INTRAVENOUS at 23:49

## 2020-03-03 RX ADMIN — INSULIN LISPRO 3 UNITS: 100 INJECTION, SOLUTION INTRAVENOUS; SUBCUTANEOUS at 13:38

## 2020-03-03 RX ADMIN — LIDOCAINE HYDROCHLORIDE 80 MG: 20 INJECTION, SOLUTION INFILTRATION; PERINEURAL at 08:28

## 2020-03-03 RX ADMIN — FENTANYL CITRATE 50 MCG: 50 INJECTION INTRAMUSCULAR; INTRAVENOUS at 08:28

## 2020-03-03 RX ADMIN — PHENYLEPHRINE HYDROCHLORIDE 100 MCG: 10 INJECTION INTRAVENOUS at 08:34

## 2020-03-03 RX ADMIN — INSULIN LISPRO 2 UNITS: 100 INJECTION, SOLUTION INTRAVENOUS; SUBCUTANEOUS at 21:31

## 2020-03-03 RX ADMIN — ACETAMINOPHEN 1000 MG: 500 TABLET, FILM COATED ORAL at 15:18

## 2020-03-03 RX ADMIN — HYDROCODONE BITARTRATE AND ACETAMINOPHEN 1 TABLET: 7.5; 325 TABLET ORAL at 21:38

## 2020-03-03 RX ADMIN — FAMOTIDINE 20 MG: 10 INJECTION INTRAVENOUS at 07:45

## 2020-03-03 RX ADMIN — FENTANYL CITRATE 25 MCG: 50 INJECTION INTRAMUSCULAR; INTRAVENOUS at 10:16

## 2020-03-03 RX ADMIN — ONDANSETRON HYDROCHLORIDE 4 MG: 2 SOLUTION INTRAMUSCULAR; INTRAVENOUS at 10:12

## 2020-03-03 RX ADMIN — VANCOMYCIN HYDROCHLORIDE 1250 MG: 10 INJECTION, POWDER, LYOPHILIZED, FOR SOLUTION INTRAVENOUS at 07:30

## 2020-03-03 RX ADMIN — CEFAZOLIN SODIUM 2 G: 2 INJECTION, SOLUTION INTRAVENOUS at 08:24

## 2020-03-03 RX ADMIN — NEOSTIGMINE METHYLSULFATE 3 MG: 1 INJECTION INTRAMUSCULAR; INTRAVENOUS; SUBCUTANEOUS at 10:16

## 2020-03-03 RX ADMIN — PHENYLEPHRINE HYDROCHLORIDE 100 MCG: 10 INJECTION INTRAVENOUS at 08:53

## 2020-03-03 RX ADMIN — FENTANYL CITRATE 25 MCG: 50 INJECTION INTRAMUSCULAR; INTRAVENOUS at 10:19

## 2020-03-03 NOTE — PLAN OF CARE
Problem: Patient Care Overview  Goal: Plan of Care Review  Outcome: Ongoing (interventions implemented as appropriate)  Flowsheets  Taken 3/3/2020 1528 by Jada Mg, PT  Progress: improving  Plan of Care Reviewed With: patient;spouse  Taken 3/3/2020 1623 by Huong Hanson, RN  Outcome Summary: pt pod 0. controlled pain well throughout shift and worked with PT and up to chair. plan to dc tomorrow with home health. educated on blood glucose monitoring.

## 2020-03-03 NOTE — ANESTHESIA POSTPROCEDURE EVALUATION
"Patient: Vicente Cook    Procedure Summary     Date:  03/03/20 Room / Location:  [facility] OR [address] [place] [address] MAIN OR    Anesthesia Start:  0822 Anesthesia Stop:  1056    Procedure:  TOTAL HIP ARTHROPLASTY ANTERIOR WITH HANA TABLE (Right Hip) Diagnosis:       Primary osteoarthritis of right hip      (Primary osteoarthritis of right hip [M16.11])    Surgeon:  Sharif Ruiz MD Provider:  Oli Vergara MD    Anesthesia Type:  general ASA Status:  3          Anesthesia Type: general    Vitals  Vitals Value Taken Time   /59 3/3/2020 11:45 AM   Temp 36.6 °C (97.9 °F) 3/3/2020 10:51 AM   Pulse 76 3/3/2020 11:50 AM   Resp 16 3/3/2020 11:30 AM   SpO2 95 % 3/3/2020 11:50 AM   Vitals shown include unvalidated device data.        Post Anesthesia Care and Evaluation    Patient location during evaluation: bedside  Patient participation: complete - patient participated  Level of consciousness: awake and alert  Pain management: adequate  Airway patency: patent  Anesthetic complications: No anesthetic complications    Cardiovascular status: acceptable  Respiratory status: acceptable  Hydration status: acceptable    Comments: /58   Pulse 73   Temp 36.6 °C (97.9 °F) (Oral)   Resp 16   Ht 185.4 cm (73\")   Wt 105 kg (230 lb 14.4 oz)   SpO2 94%   BMI 30.46 kg/m²       "

## 2020-03-03 NOTE — ANESTHESIA PROCEDURE NOTES
Airway  Urgency: elective    Date/Time: 3/3/2020 8:31 AM  Airway not difficult (CMAC used due to poor DL visualization with MAC 4)    General Information and Staff    Patient location during procedure: OR  Anesthesiologist: Oli Vergara MD  CRNA: Eveline Marcial CRNA    Indications and Patient Condition  Indications for airway management: airway protection    Preoxygenated: yes  MILS not maintained throughout  Mask difficulty assessment: 2 - vent by mask + OA or adjuvant +/- NMBA    Final Airway Details  Final airway type: endotracheal airway      Successful airway: ETT  Cuffed: yes   Successful intubation technique: video laryngoscopy  Facilitating devices/methods: intubating stylet  Endotracheal tube insertion site: oral  Blade: CMAC  Blade size: D  ETT size (mm): 7.5  Cormack-Lehane Classification: grade I - full view of glottis (with CMAC )  Placement verified by: chest auscultation and capnometry   Measured from: lips  ETT/EBT  to lips (cm): 23  Number of attempts at approach: 1  Assessment: lips, teeth, and gum same as pre-op and atraumatic intubation

## 2020-03-03 NOTE — ANESTHESIA PREPROCEDURE EVALUATION
Anesthesia Evaluation     Patient summary reviewed and Nursing notes reviewed   no history of anesthetic complications:  NPO Solid Status: > 8 hours  NPO Liquid Status: > 2 hours           Airway   Mallampati: III  TM distance: >3 FB  Neck ROM: full  Possible difficult intubation and Small opening  Dental - normal exam     Pulmonary - normal exam   (+) a smoker Former,   (-) COPD, asthma, lung cancer    ROS comment: obstructive sleep apnea STOP BANG 7.  Not diagnosed w/ FLORINA per his report but strong likelihood.  Cardiovascular - normal exam  Exercise tolerance: excellent (>7 METS)    ECG reviewed  Rhythm: regular  Rate: normal    (+) hypertension well controlled less than 2 medications, PVD, hyperlipidemia,  carotid artery disease carotid bilateral  (-) valvular problems/murmurs, past MI, CAD, dysrhythmias, angina, CHF, cardiac stents    ROS comment: Known stable AAA, 3cm at last CT done this year.  Bilateral carotid artery disease.  EKG shows LAFB    Neuro/Psych- negative ROS  (-) seizures, TIA, CVA  GI/Hepatic/Renal/Endo    (+) obesity,   diabetes mellitus type 2 well controlled using insulin,   (-) hiatal hernia, GERD, PUD, hepatitis, liver disease, no renal disease, GI bleed    Musculoskeletal     Abdominal  - normal exam   Substance History - negative use     OB/GYN negative ob/gyn ROS         Other   arthritis,                    Anesthesia Plan    ASA 3     general     intravenous induction     Anesthetic plan, all risks, benefits, and alternatives have been provided, discussed and informed consent has been obtained with: patient.    Plan discussed with CRNA and attending.

## 2020-03-03 NOTE — PLAN OF CARE
Problem: Patient Care Overview  Goal: Plan of Care Review  Flowsheets (Taken 3/3/2020 2962)  Progress: improving  Plan of Care Reviewed With: patient;spouse  Outcome Summary: Pt agreeable to PT this afternoon. He presents s/p R anterior THR. He now complains of post op pain, weakness, and decreased functional mobility. Pt also concerned about possible leg length discrepancy with R leg being slighlty longer than L. Pt was able to tolerate hip exercises with assistance and ambulate short distance in room using Rwx. He felt slighlty unsteady due to possible leg length difference. He plans home tomorrow upon DC and will continue to benefit from skilled PT to maximize safety and independence with mobility.

## 2020-03-03 NOTE — DISCHARGE PLACEMENT REQUEST
"Vicente Cook (69 y.o. Male)     Date of Birth Social Security Number Address Home Phone MRN    1950  [id number] [address] [phone] [id number]    Congregational Marital Status          Confucianism        Admission Date Admission Type Admitting Provider Attending Provider Department, Room/Bed    3/3/20 Elective Sharif Ruiz MD Yakkanti, Madhusudhan R, MD [address], P886/1    Discharge Date Discharge Disposition Discharge Destination                       Attending Provider:  Sharif Ruiz MD    Allergies:  Invokamet [Canagliflozin-metformin Hcl]    Isolation:  None   Infection:  None   Code Status:  CPR    Ht:  185.4 cm (73\")   Wt:  105 kg (230 lb 14.4 oz)    Admission Cmt:  None   Principal Problem:  Primary osteoarthritis of right hip [M16.11]                 Active Insurance as of 3/3/2020     Primary Coverage     Payor Plan Insurance Group Employer/Plan Group    MEDICARE MEDICARE A & B      Payor Plan Address Payor Plan Phone Number Payor Plan Fax Number Effective Dates    [address] [phone]  12/1/2016 - None Entered    [facility] [id number]       Subscriber Name Subscriber Birth Date Member ID       VICENTE COOK 1950 [id number]           Secondary Coverage     Payor Plan Insurance Group Employer/Plan Group    [employer] [place] [employer] [place]      Payor Plan Address Payor Plan Phone Number Payor Plan Fax Number Effective Dates    [address] [employer] [place] [address] [phone]  9/1/2017 - None Entered    [place] [address]       Subscriber Name Subscriber Birth Date Member ID       VICENTE COOK 1950 [id number]                 Emergency Contacts      (Rel.) Home Phone Work Phone Mobile Phone    Nilsa Cook (Spouse) -- -- [phone]              "

## 2020-03-03 NOTE — CONSULTS
[facility]    Inpatient Consult to Hospitalist  Consult performed by: Chito Spivey MD  Consult ordered by: Sharif Ruiz MD  Reason for consult: Hypertension and diabetes      Date of Admit: 3/3/2020  Date of Consult: 20    Subjective   69 y.o. male     Past Medical History:   Diagnosis Date   • Arthritis    • Colon polyps    • Diabetes mellitus (CMS/HCC)    • History of pancreatitis    • Hyperlipidemia    • Hypertension    • Risk factors for obstructive sleep apnea     STOP BANG 7      Past Surgical History:   Procedure Laterality Date   • ANKLE SURGERY Left    • COLONOSCOPY W/ POLYPECTOMY     • TONSILLECTOMY     • TOTAL HIP ARTHROPLASTY Left 2018    Procedure: LT TOTAL HIP ARTHROPLASTY ANTERIOR WITH HANA TABLE;  Surgeon: Sharif Ruiz MD;  Location: [facility];  Service: Orthopedics     Current medications reviewed.    Family History   Problem Relation Age of Onset   • Malig Hyperthermia Neg Hx      Social History     Tobacco Use   • Smoking status: Former Smoker     Packs/day: 1.50     Years: 20.00     Pack years: 30.00     Types: Cigarettes     Last attempt to quit:      Years since quittin.1   • Smokeless tobacco: Never Used   Substance Use Topics   • Alcohol use: Yes     Comment: SOCIAL   • Drug use: No     Review of Systems   Constitutional: Negative.  Negative for chills and fever.   HENT: Negative.    Eyes: Negative.    Respiratory: Negative.  Negative for chest tightness and shortness of breath.    Cardiovascular: Negative.  Negative for chest pain.   Gastrointestinal: Negative.  Negative for abdominal pain, diarrhea, nausea and vomiting.   Endocrine: Negative.    Genitourinary: Negative.    Musculoskeletal: Positive for arthralgias.   Skin: Negative.    Allergic/Immunologic: Negative.    Neurological: Negative.    Hematological: Negative.    Psychiatric/Behavioral: Negative.      Objective       Vital Signs  Temp:  [97.5 °F (36.4 °C)-98.6 °F (37 °C)] 98.1 °F (36.7 °C)  Heart Rate:  [62-86] 82  Resp:  [16-18] 16  BP: ()/(50-80) 136/80    Physical Exam   Constitutional: He is oriented to person, place, and time. He appears well-developed and well-nourished.  Non-toxic appearance. He does not have a sickly appearance. He does not appear ill. No distress.   HENT:   Head: Normocephalic and atraumatic.   Cardiovascular: Normal rate, regular rhythm and intact distal pulses.   Pulses:       Radial pulses are 2+ on the right side, and 2+ on the left side.        Dorsalis pedis pulses are 2+ on the right side, and 2+ on the left side.   Pulmonary/Chest: Effort normal and breath sounds normal. No respiratory distress. He has no wheezes. He has no rales.   Abdominal: Soft. Bowel sounds are normal. He exhibits no distension. There is no tenderness. There is no rebound and no guarding.   Musculoskeletal: He exhibits no edema.   Neurological: He is alert and oriented to person, place, and time.   Skin: Skin is warm and dry.   Psychiatric: He has a normal mood and affect. His behavior is normal.   Nursing note and vitals reviewed.    Results Review:  I reviewed the patient's new clinical results.  I reviewed the patient's new imaging results and agree with the interpretation.  I personally viewed and interpreted the patient's EKG/Telemetry data    Estimated Creatinine Clearance: 78.2 mL/min (by C-G formula based on SCr of 1.15 mg/dL).    Assessment/Plan   Active Hospital Problems    Diagnosis  POA   • Status post right hip replacement [Z96.641]  Not Applicable   • FLORINA (obstructive sleep apnea) [G47.33]  Unknown   • AAA (abdominal aortic aneurysm) without rupture (CMS/HCC) [I71.4]  Unknown   • Hypertension [I10]  Yes   • Type 2 diabetes mellitus (CMS/HCC) [E11.9]  Yes      Resolved Hospital Problems    Diagnosis Date Resolved POA   • **Primary osteoarthritis of right hip [M16.11] 03/03/2020 Unknown     69 y.o.  male     · status post right hip arthroplasty 3/3/2020  · DVT prophylaxis per orthopedic surgery: Aspirin 81 mg twice daily started on postop day 1  · Monitor for acute blood loss anemia, BP fluctuations  · Added holding parameters for antihypertensives.  · Last A1c 7.78 last month. Monitor and continue correctional insulin.  He is on basal insulin at home will give attenuated dose here.  Further adjustments depending on glucoses overnight  · 3 cm AAA followed with yearly exams (2/27/2020 CTA stable)  · Hyperlipidemia: Lipitor at home.  Not ordered here  · discussed with patient and family.  · [facility] in this patient's care. Will follow along with you.    Chito Spivey MD  03/03/20  2:50 PM

## 2020-03-03 NOTE — THERAPY EVALUATION
Patient Name: Vicente Cook  : 1950    MRN: [id number]                              Today's Date: 3/3/2020       Admit Date: 3/3/2020    Visit Dx:     ICD-10-CM ICD-9-CM   1. Primary osteoarthritis of right hip M16.11 715.15     Patient Active Problem List   Diagnosis   • Hypertension   • Type 2 diabetes mellitus (CMS/HCC)   • Status post right hip replacement   • FLORINA (obstructive sleep apnea)   • AAA (abdominal aortic aneurysm) without rupture (CMS/HCC)     Past Medical History:   Diagnosis Date   • Arthritis    • Colon polyps    • Diabetes mellitus (CMS/HCC)    • History of pancreatitis    • Hyperlipidemia    • Hypertension    • Risk factors for obstructive sleep apnea     STOP BANG 7      Past Surgical History:   Procedure Laterality Date   • ANKLE SURGERY Left    • COLONOSCOPY W/ POLYPECTOMY     • TONSILLECTOMY     • TOTAL HIP ARTHROPLASTY Left 2018    Procedure: LT TOTAL HIP ARTHROPLASTY ANTERIOR WITH HANA TABLE;  Surgeon: Sharif Ruiz MD;  Location: [facility] OR;  Service: Orthopedics     General Information     Row Name 20 1526          PT Evaluation Time/Intention    Document Type  evaluation  -EJ     Mode of Treatment  physical therapy  -EJ     Row Name 20 1526          General Information    Patient Profile Reviewed?  yes  -EJ     Prior Level of Function  independent:;ADL's;community mobility;all household mobility  -EJ     Existing Precautions/Restrictions  hip, anterior;right  -EJ     Barriers to Rehab  none identified  -EJ     Row Name 20 1526          Relationship/Environment    Lives With  spouse  -EJ     Row Name 20 1526          Resource/Environmental Concerns    Current Living Arrangements  home/apartment/condo  -EJ     Row Name 20 1526          Home Main Entrance    Number of Stairs, Main Entrance  four  -EJ     Row Name 20 1526          Cognitive Assessment/Intervention- PT/OT    Orientation Status (Cognition)  oriented x 4   -EJ     Row Name 03/03/20 1526          Safety Issues, Functional Mobility    Impairments Affecting Function (Mobility)  range of motion (ROM);strength;pain  -EJ       User Key  (r) = Recorded By, (t) = Taken By, (c) = Cosigned By    Initials Name Provider Type    Jada Dejesus, PT Physical Therapist        Mobility     Row Name 03/03/20 1527          Bed Mobility Assessment/Treatment    Bed Mobility Assessment/Treatment  supine-sit  -EJ     Supine-Sit [place] (Bed Mobility)  verbal cues;minimum assist (75% patient effort)  -EJ     Assistive Device (Bed Mobility)  bed rails;head of bed elevated  -EJ     Row Name 03/03/20 1527          Sit-Stand Transfer    Sit-Stand [place] (Transfers)  verbal cues;minimum assist (75% patient effort)  -EJ     Assistive Device (Sit-Stand Transfers)  walker, front-wheeled  -EJ     Row Name 03/03/20 1527          Gait/Stairs Assessment/Training    Gait/Stairs Assessment/Training  gait/ambulation independence  -EJ     [place] Level (Gait)  verbal cues;minimum assist (75% patient effort)  -EJ     Assistive Device (Gait)  walker, front-wheeled  -EJ     Distance in Feet (Gait)  5  -EJ     Deviations/Abnormal Patterns (Gait)  antalgic;tom decreased;stride length decreased  -EJ     Bilateral Gait Deviations  forward flexed posture;heel strike decreased;weight shift ability decreased  -EJ     Comment (Gait/Stairs)  limited due to pain, also pt noticing a leg length discrepancy that is affecting his balance since sx  -EJ       User Key  (r) = Recorded By, (t) = Taken By, (c) = Cosigned By    Initials Name Provider Type    Jada Dejesus PT Physical Therapist        Obj/Interventions     Row Name 03/03/20 1528          General ROM    GENERAL ROM COMMENTS  WFL, R hip  -EJ     Row Name 03/03/20 1528          MMT (Manual Muscle Testing)    General MMT Comments  post op weakness  -EJ     Row Name 03/03/20 1528          Therapeutic Exercise    Comment (Therapeutic  Exercise)  R TKR protocol x 10 reps  -EJ       User Key  (r) = Recorded By, (t) = Taken By, (c) = Cosigned By    Initials Name Provider Type    Jada Dejesus, PT Physical Therapist        Goals/Plan     Row Name 03/03/20 1533          Bed Mobility Goal 1 (PT)    Activity/Assistive Device (Bed Mobility Goal 1, PT)  bed mobility activities, all  -EJ     [place] Level/Cues Needed (Bed Mobility Goal 1, PT)  standby assist  -EJ     Time Frame (Bed Mobility Goal 1, PT)  3 days  -EJ     Row Name 03/03/20 1533          Transfer Goal 1 (PT)    Activity/Assistive Device (Transfer Goal 1, PT)  transfers, all;walker, rolling  -EJ     [place] Level/Cues Needed (Transfer Goal 1, PT)  standby assist  -EJ     Time Frame (Transfer Goal 1, PT)  3 days  -EJ     Row Name 03/03/20 1533          Gait Training Goal 1 (PT)    Activity/Assistive Device (Gait Training Goal 1, PT)  gait (walking locomotion);walker, rolling  -EJ     [place] Level (Gait Training Goal 1, PT)  standby assist  -EJ     Distance (Gait Goal 1, PT)  100  -EJ     Time Frame (Gait Training Goal 1, PT)  3 days  -EJ     [facility] Name 03/03/20 1533          Stairs Goal 1 (PT)    Activity/Assistive Device (Stairs Goal 1, PT)  stairs, all skills  -EJ     [place] Level/Cues Needed (Stairs Goal 1, PT)  contact guard assist  -EJ     Number of Stairs (Stairs Goal 1, PT)  4  -EJ     Time Frame (Stairs Goal 1, PT)  3 days  -EJ       User Key  (r) = Recorded By, (t) = Taken By, (c) = Cosigned By    Initials Name Provider Type    Jada Dejesus, PT Physical Therapist        Clinical Impression     Row Name 03/03/20 1528          Pain Assessment    Additional Documentation  Pain Scale: Numbers Pre/Post-Treatment (Group)  -EJ     Row Name 03/03/20 1528          Pain Scale: Numbers Pre/Post-Treatment    Pain Scale: Numbers, Pretreatment  4/10  -EJ     Pain Scale: Numbers, Post-Treatment  7/10  -EJ     Pain Location - Side  Right  -EJ     Pain Location  hip   -EJ     Pain Intervention(s)  Repositioned  -EJ     Row Name 03/03/20 1528          Plan of Care Review    Plan of Care Reviewed With  patient;spouse  -EJ     Progress  improving  -EJ     Outcome Summary  Pt agreeable to PT this afternoon. He presents s/p R anterior THR. He now complains of post op pain, weakness, and decreased functional mobility. Pt also concerned about possible leg length discrepancy with R leg being slighlty longer than L. Pt was able to tolerate hip exercises with assistance and ambulate short distance in room using Rwx. He felt slighlty unsteady due to possible leg length difference. He plans home tomorrow upon DC and will continue to benefit from skilled PT to maximize safety and independence with mobility.  -EJ     Row Name 03/03/20 1528          Physical Therapy Clinical Impression    Patient/Family Goals Statement (PT Clinical Impression)  home tomorrow.  -EJ     Criteria for Skilled Interventions Met (PT Clinical Impression)  yes  -EJ     Rehab Potential (PT Clinical Summary)  good, to achieve stated therapy goals  -EJ     Row Name 03/03/20 1528          Positioning and Restraints    Pre-Treatment Position  in bed  -EJ     Post Treatment Position  chair  -EJ     In Chair  notified nsg;reclined;call light within reach;encouraged to call for assist;exit alarm on;with family/caregiver  -EJ       User Key  (r) = Recorded By, (t) = Taken By, (c) = Cosigned By    Initials Name Provider Type    Jada Dejesus, PT Physical Therapist        Outcome Measures     Row Name 03/03/20 1533          How much help from another person do you currently need...    Turning from your back to your side while in flat bed without using bedrails?  3  -EJ     Moving from lying on back to sitting on the side of a flat bed without bedrails?  3  -EJ     Moving to and from a bed to a chair (including a wheelchair)?  3  -EJ     Standing up from a chair using your arms (e.g., wheelchair, bedside chair)?  3  -EJ      Climbing 3-5 steps with a railing?  2  -EJ     To walk in hospital room?  3  -EJ     AM-PAC 6 Clicks Score (PT)  17  -EJ     Row Name 03/03/20 1533          Functional Assessment    Outcome Measure Options  AM-PAC 6 Clicks Basic Mobility (PT)  -EJ       User Key  (r) = Recorded By, (t) = Taken By, (c) = Cosigned By    Initials Name Provider Type    Jada Dejesus, PT Physical Therapist          PT Recommendation and Plan  Planned Therapy Interventions (PT Eval): balance training, bed mobility training, home exercise program, gait training, patient/family education, ROM (range of motion), stair training, strengthening, transfer training  Outcome Summary/Treatment Plan (PT)  Anticipated Discharge Disposition (PT): home with assist, home with home health  Plan of Care Reviewed With: patient, spouse  Progress: improving  Outcome Summary: Pt agreeable to PT this afternoon. He presents s/p R anterior THR. He now complains of post op pain, weakness, and decreased functional mobility. Pt also concerned about possible leg length discrepancy with R leg being slighlty longer than L. Pt was able to tolerate hip exercises with assistance and ambulate short distance in room using Rwx. He felt slighlty unsteady due to possible leg length difference. He plans home tomorrow upon DC and will continue to benefit from skilled PT to maximize safety and independence with mobility.     Time Calculation:   PT Charges     Row Name 03/03/20 1535             Time Calculation    Start Time  1500  -EJ      Stop Time  1524  -EJ      Time Calculation (min)  24 min  -EJ      PT Received On  03/03/20  -EJ      PT - Next Appointment  03/04/20  -EJ      PT Goal Re-Cert Due Date  03/06/20  -EJ         Time Calculation- PT    Total Timed Code Minutes- PT  14 minute(s)  -EJ        User Key  (r) = Recorded By, (t) = Taken By, (c) = Cosigned By    Initials Name Provider Type    Jada Dejesus PT Physical Therapist        Therapy Charges  for Today     Code Description Service Date Service Provider Modifiers Qty    84253295642 HC PT EVAL MOD COMPLEXITY 2 3/3/2020 Jada Mg, PT GP 1    96262756745 HC PT THER PROC EA 15 MIN 3/3/2020 Jada Mg, PT GP 1          PT G-Codes  Outcome Measure Options: AM-PAC 6 Clicks Basic Mobility (PT)  AM-PAC 6 Clicks Score (PT): 17    Jada Mg, PT  3/3/2020

## 2020-03-03 NOTE — OP NOTE
Operative  Note    Patient: Vicente Cook  YOB: 1950    Medical Record Number: [id number]    Attending Physician: Sharif Ruiz,*    Date of Service: 3/3/2020     Pre-op Diagnosis:   Primary osteoarthritis of right hip [M16.11]    Post-Op Diagnosis Codes:  Primary osteoarthritis of right hip [M16.11]    PROCEDURE PERFORMED: RIGHT TOTAL HIP ARTHROPLASTY ANTERIOR WITH HANA TABLE by utilizing a Direct anterior approach with  Depuy   [place] Gription Acetabular  Shell Sector with  holes size 52 mm outer diameter Neutral ALTRX  Polyethylene acetabular  liner- 36 mm internal diameter,   Actis Duo fix Cementless High offset  Collar femoral stem size # 7 with a   Delta ceramic femoral head- 36 mm outer diameter, + 5 neck length by utilizing a [place] table ([employer]).     Implant Name Type Inv. Item Serial No.  Lot No. LRB No. Used   LINER ACET ALTRX PINN NTRL [id number] - [id number] Implant LINER ACET ALTRX PINN NTRL [id number]  DEPUY [id number] Right 1   CUP ACET PINN SECTOR W GRIPTN 52MM - [id number] Implant CUP ACET PINN SECTOR W GRIPTN 52MM  DEPUY [id number] Right 1   STEM FEM ACTIS COLR TPR CMTLS HI/OFFST SZ7 - [id number] Implant STEM FEM ACTIS COLR TPR CMTLS HI/OFFST SZ7  DEPUY [id number] Right 1   HD FEM BIOLOXDELTA/ART CERAM 36MM PLS5 - [id number] Implant HD FEM BIOLOXDELTA/ART CERAM 36MM PLS5  DEPUY [id number] Right 1       SURGEON: Sharif Ruiz MD     ASSISTANT: Jaskaran Smith MD Fellow     Mikala Kurtz CSA     The services of a first assist were necessary for performing the procedure safely and expeditiously.  The first assist was present for the entire duration of the case and helped with positioning, retraction and closure of the incision.     ANESTHESIA:  General  Anesthesiologist: Oli Vergara MD  CRNA: Eveline Marcial CRNA     Estimated Blood Loss: 200 mL    Specimens: * No orders in the log *    COMPLICATIONS: Nil.     DRAINS: * No LDAs found *    INDICATIONS: The  patient is a 69 y.o. male who presented to my office with complaints of progressively worsening pain in the hip. The patient has reached a point of disability secondary to the severe pain and immobility. The patient had difficulty with activities of daily living.  The patient has failed nonoperative management.      The medical history was reviewed. The patient was indicated for a  total hip arthroplasty. Likely risks and benefits of the procedure including, but not limited to infection, DVT, pulmonary embolism, leg length discrepancy, recurrent dislocation, possibility of injury to nerves or vessels, and periprosthetic fractures, Possibility of medical complications including but not limited to stroke, heart attack have been discussed in detail. Despite the risks involved, the patient elected to proceed and informed consent was obtained. The patient was seen in the preoperative holding area, and the  operative site was marked.     He has a previous left EMILY with me and did well.     DESCRIPTION OF PROCEDURE: The patient has been transferred to [facility] Operating Room.   Preoperative antibiotics in the form of Vancomycin and  Kefzol was given intravenously prior to the incision.   After achieving adequate general anesthesia, the patient was transferred onto the [place] table. All bony prominences were padded adequately.   The operative hip was prepped and draped in the usual sterile fashion.   Surgical timeout was done. Correct patient, surgical side and site were identified.  Tranexamic acid was given intravenously at the time of skin incision.    A skin incision was made overlying the anterolateral aspect of the  hip for about 10 cm from just below and lateral to the anterior/superior iliac spine. Skin and subcutaneous tissue were incised and the deep fascia was incised in line with the skin incision overlying the tensor fascia rachid muscle. The tensor muscle was retracted laterally and interval  between the sartorius and the rectus femoris medially and the tensor fascia rachid muscle was developed. The lateral circumflex femoral vessels were identified. They were clamped, cut, and ligated. Unnamed deep fascia was incised. Capsule of the hip joint was identified. Retractors were placed extracapsularly.     The capsule was incised in a L-shaped manner and the anterior capsule was tagged with FiberWire.  Followed by this, the retractors were placed intracapsularly. There was a large effusion and a thickened capsule.     Appropriate capsular releases were done along the inferior and  medial neck and along the saddle of the femoral neck.  The femoral neck was identified. The femoral neck osteotomy was done according to the preoperative templating , utilizing an oscillating saw. Femoral head was extracted using a corkscrew without any difficulty. The femoral head revealed presence of extensive loss of articular cartilage in the superior weight bearing portion along with femoral head osteophytes.      The acetabular cavity was inspected and exposed appropriately by placing retractors taking care to protect the anterior neurovascular structures. . The labrum was excised, pulvinar was excised from the cotyloid fossa. Acetabular cavity was progressively reamed, maintaining the correct inclination and version under image intensifier control. Adequate medialization was obtained. Adequate fit was found to be obtained at reamer size 52 .  The  [place] Gription  Acetabular Shell Sector with  holes  52 mm was seated into position. It was found to be seated satisfactorily with adequate inclination and anteversion. There was good stability. Followed by this, a polyethylene liner was seated into position, checked for stability. This was a 36 mm internal diameter,  highly cross linked neutral 0° lip liner.     The periarticular tissue was infiltrated with local anaesthetic injection which consisted of Naropin, clonidine and  ketorolac mixture.     Attention was then directed to the proximal femur. The proximal femur was delivered by utilizing a trochanteric hook placed just distal to the vastus tubercle and proximal to the gluteus gomez tendon. The leg was then externally rotated with the gross traction released and  hyperextension, adduction was done using the [place] table spar. The mechanical lift on the table was utilized to support the femur.  Appropriate capsular releases were made to expose the medial slope of the greater trochanter and the proximal femur was delivered. The femoral canal was entered by removing the lateral femoral neck with a box chisel followed by serial broaching. Adequate fit was found to be obtained with a size 7 broach. A trial reduction was performed. Leg lengths and offset were found to be satisfactory under image intensifier on comparison with the opposite hip under image intensifier. Reduction was found to be satisfactory and there was good stability with range of motion of the hip in internal and external rotation. Having been satisfied with the trials, the hip joint was dislocated.     The femoral  trial broach was removed and  Actis Duo fix Cementless High offset Collar femoral stem size # 7 was seated into position. This was found to be satisfactorily seated with good stability.      The delta ceramic femoral head 36 mm +5 mm neck length was seated onto the trunnion and impacted. Followed by this, the hip joint was reduced. Reduction was found to be satisfactory and image confirmed leg length, offset , implant position and stem fill of the femoral canal.  There were no iatrogenic fractures. Hip joint was thoroughly irrigated with saline. Soft tissue hemostasis was secured. The sponge count and needle count was correct. The FiberWire sutures was tagged to the anterior capsular flaps and they were tied to each other. The incision was closed in layers with vicryl and monocryl sutures and the patient was  transferred to the recovery room in a stable condition.     The patient tolerated the procedure well. There were no complications. The patient had adequate distal pulses and good capillary refill.     The patient will be mobilized with physical therapy.   Antibiotic prophylaxis  in the form of Kefzol postoperatively two more doses will be given in the first 24 hours.   The  patient will be started on DVT prophylaxis with  Aspirin 81 mg twice daily starting on postoperative day #1.    I discussed the satisfactory performance of the procedure with the patient's family and discussed with them the postoperative management.    Sharif Ruiz MD     Date: 3/3/2020  Time: 10:29 AM    cc: Elvira Yee MD; MD Sara Guzman Madhusudhan R,*

## 2020-03-04 VITALS
HEIGHT: 73 IN | OXYGEN SATURATION: 93 % | HEART RATE: 97 BPM | TEMPERATURE: 99 F | RESPIRATION RATE: 18 BRPM | DIASTOLIC BLOOD PRESSURE: 59 MMHG | SYSTOLIC BLOOD PRESSURE: 123 MMHG | WEIGHT: 230.9 LBS | BODY MASS INDEX: 30.6 KG/M2

## 2020-03-04 LAB
ANION GAP SERPL CALCULATED.3IONS-SCNC: 13.3 MMOL/L (ref 5–15)
BASOPHILS # BLD AUTO: 0.04 10*3/MM3 (ref 0–0.2)
BASOPHILS NFR BLD AUTO: 0.3 % (ref 0–1.5)
BUN BLD-MCNC: 20 MG/DL (ref 8–23)
BUN/CREAT SERPL: 18.9 (ref 7–25)
CALCIUM SPEC-SCNC: 7.8 MG/DL (ref 8.6–10.5)
CHLORIDE SERPL-SCNC: 100 MMOL/L (ref 98–107)
CO2 SERPL-SCNC: 21.7 MMOL/L (ref 22–29)
CREAT BLD-MCNC: 1.06 MG/DL (ref 0.76–1.27)
DEPRECATED RDW RBC AUTO: 43.1 FL (ref 37–54)
EOSINOPHIL # BLD AUTO: 0 10*3/MM3 (ref 0–0.4)
EOSINOPHIL NFR BLD AUTO: 0 % (ref 0.3–6.2)
ERYTHROCYTE [DISTWIDTH] IN BLOOD BY AUTOMATED COUNT: 12.3 % (ref 12.3–15.4)
GFR SERPL CREATININE-BSD FRML MDRD: 69 ML/MIN/1.73
GLUCOSE BLD-MCNC: 212 MG/DL (ref 65–99)
GLUCOSE BLDC GLUCOMTR-MCNC: 216 MG/DL (ref 70–130)
GLUCOSE BLDC GLUCOMTR-MCNC: 228 MG/DL (ref 70–130)
HCT VFR BLD AUTO: 33.7 % (ref 37.5–51)
HGB BLD-MCNC: 11.6 G/DL (ref 13–17.7)
IMM GRANULOCYTES # BLD AUTO: 0.1 10*3/MM3 (ref 0–0.05)
IMM GRANULOCYTES NFR BLD AUTO: 0.6 % (ref 0–0.5)
LYMPHOCYTES # BLD AUTO: 1.23 10*3/MM3 (ref 0.7–3.1)
LYMPHOCYTES NFR BLD AUTO: 7.7 % (ref 19.6–45.3)
MCH RBC QN AUTO: 32.6 PG (ref 26.6–33)
MCHC RBC AUTO-ENTMCNC: 34.4 G/DL (ref 31.5–35.7)
MCV RBC AUTO: 94.7 FL (ref 79–97)
MONOCYTES # BLD AUTO: 1.47 10*3/MM3 (ref 0.1–0.9)
MONOCYTES NFR BLD AUTO: 9.3 % (ref 5–12)
NEUTROPHILS # BLD AUTO: 13.05 10*3/MM3 (ref 1.7–7)
NEUTROPHILS NFR BLD AUTO: 82.1 % (ref 42.7–76)
NRBC BLD AUTO-RTO: 0 /100 WBC (ref 0–0.2)
PLATELET # BLD AUTO: 180 10*3/MM3 (ref 140–450)
PMV BLD AUTO: 9.7 FL (ref 6–12)
POTASSIUM BLD-SCNC: 4.1 MMOL/L (ref 3.5–5.2)
RBC # BLD AUTO: 3.56 10*6/MM3 (ref 4.14–5.8)
SODIUM BLD-SCNC: 135 MMOL/L (ref 136–145)
WBC NRBC COR # BLD: 15.89 10*3/MM3 (ref 3.4–10.8)

## 2020-03-04 PROCEDURE — 63710000001 INSULIN LISPRO (HUMAN) PER 5 UNITS: Performed by: HOSPITALIST

## 2020-03-04 PROCEDURE — 97110 THERAPEUTIC EXERCISES: CPT

## 2020-03-04 PROCEDURE — 82962 GLUCOSE BLOOD TEST: CPT

## 2020-03-04 PROCEDURE — 97150 GROUP THERAPEUTIC PROCEDURES: CPT

## 2020-03-04 PROCEDURE — 80048 BASIC METABOLIC PNL TOTAL CA: CPT | Performed by: ORTHOPAEDIC SURGERY

## 2020-03-04 PROCEDURE — 63710000001 INSULIN LISPRO (HUMAN) PER 5 UNITS: Performed by: ORTHOPAEDIC SURGERY

## 2020-03-04 PROCEDURE — 85025 COMPLETE CBC W/AUTO DIFF WBC: CPT | Performed by: ORTHOPAEDIC SURGERY

## 2020-03-04 RX ORDER — OXYCODONE AND ACETAMINOPHEN 7.5; 325 MG/1; MG/1
1 TABLET ORAL EVERY 4 HOURS PRN
Status: DISCONTINUED | OUTPATIENT
Start: 2020-03-04 | End: 2020-03-04 | Stop reason: HOSPADM

## 2020-03-04 RX ORDER — OXYCODONE AND ACETAMINOPHEN 7.5; 325 MG/1; MG/1
1 TABLET ORAL EVERY 4 HOURS PRN
Qty: 42 TABLET | Refills: 0 | Status: SHIPPED | OUTPATIENT
Start: 2020-03-04 | End: 2022-07-22

## 2020-03-04 RX ORDER — ASPIRIN 81 MG/1
81 TABLET ORAL EVERY 12 HOURS SCHEDULED
Qty: 60 TABLET | Refills: 1 | Status: SHIPPED | OUTPATIENT
Start: 2020-03-04 | End: 2020-04-04

## 2020-03-04 RX ORDER — ONDANSETRON 4 MG/1
4 TABLET, FILM COATED ORAL EVERY 6 HOURS PRN
Qty: 30 TABLET | Refills: 0 | Status: SHIPPED | OUTPATIENT
Start: 2020-03-04 | End: 2020-03-14

## 2020-03-04 RX ORDER — PSEUDOEPHEDRINE HCL 30 MG
100 TABLET ORAL 2 TIMES DAILY PRN
Qty: 30 EACH | Refills: 0 | Status: SHIPPED | OUTPATIENT
Start: 2020-03-04 | End: 2020-03-19

## 2020-03-04 RX ORDER — BISACODYL 5 MG/1
10 TABLET, DELAYED RELEASE ORAL DAILY PRN
Qty: 10 TABLET | Refills: 0 | Status: SHIPPED | OUTPATIENT
Start: 2020-03-04 | End: 2020-03-13

## 2020-03-04 RX ADMIN — OXYCODONE HYDROCHLORIDE AND ACETAMINOPHEN 1 TABLET: 7.5; 325 TABLET ORAL at 11:43

## 2020-03-04 RX ADMIN — HYDROCODONE BITARTRATE AND ACETAMINOPHEN 2 TABLET: 7.5; 325 TABLET ORAL at 03:52

## 2020-03-04 RX ADMIN — INSULIN LISPRO 4 UNITS: 100 INJECTION, SOLUTION INTRAVENOUS; SUBCUTANEOUS at 11:43

## 2020-03-04 RX ADMIN — INSULIN LISPRO 31.5 UNITS: 100 INJECTION, SOLUTION INTRAVENOUS; SUBCUTANEOUS at 08:16

## 2020-03-04 RX ADMIN — OXYCODONE HYDROCHLORIDE AND ACETAMINOPHEN 1 TABLET: 7.5; 325 TABLET ORAL at 15:39

## 2020-03-04 RX ADMIN — INSULIN LISPRO 4 UNITS: 100 INJECTION, SOLUTION INTRAVENOUS; SUBCUTANEOUS at 08:16

## 2020-03-04 RX ADMIN — LISINOPRIL: 10 TABLET ORAL at 08:16

## 2020-03-04 RX ADMIN — ASPIRIN 81 MG: 81 TABLET, COATED ORAL at 08:16

## 2020-03-04 RX ADMIN — INSULIN LISPRO 31.5 UNITS: 100 INJECTION, SOLUTION INTRAVENOUS; SUBCUTANEOUS at 11:43

## 2020-03-04 RX ADMIN — HYDROCODONE BITARTRATE AND ACETAMINOPHEN 2 TABLET: 7.5; 325 TABLET ORAL at 07:35

## 2020-03-04 NOTE — PLAN OF CARE
Problem: Patient Care Overview  Goal: Plan of Care Review  Outcome: Ongoing (interventions implemented as appropriate)  Flowsheets  Taken 3/3/2020 1949  Plan of Care Reviewed With: patient  Taken 3/4/2020 0337  Outcome Summary: Pt is 69/M POD#1 of right EMILY. Dressing is C/D/I. VSS. Ambulates with assist x 1 and walker. Pain has been managed this shift with PO meds. Some complaints of nausea. Zofran given. Educated on Incentive spirometer, and glucose monitoring r/t Diabetes. Plan is to d/c home today if stable.

## 2020-03-04 NOTE — PLAN OF CARE
Problem: Patient Care Overview  Goal: Plan of Care Review  Outcome: Ongoing (interventions implemented as appropriate)  Flowsheets (Taken 3/4/2020 1051)  Progress: improving  Plan of Care Reviewed With: patient  Outcome Summary: Pt tolerated treatment well this date. Ambulated 140ft w/ Rw and SBA, then completed R THR protocol. Also completed stairs w/ CGA for safety. Pt progressing very well. Safe to d/c home w/ HH from PT standpoint.

## 2020-03-04 NOTE — DISCHARGE SUMMARY
Orthopedic Discharge Summary      Patient: Vicente Cook    YOB: 1950    Medical Record Number: [id number]    Attending Physician: Sharif Ruiz,*    Consulting Physician(s):   Consulting Physician(s)     Provider Relationship Specialty    Jeremiah Stein MD Consulting Physician Hospitalist          Date of Admission: 3/3/2020  6:09 AM  Date of Discharge:     Admitting Diagnosis: Primary osteoarthritis of right hip [M16.11]  Primary osteoarthritis of right hip [M16.11]    Procedure(s):  TOTAL HIP ARTHROPLASTY ANTERIOR WITH HANA TABLE    Patient Active Problem List   Diagnosis   • Hypertension   • Type 2 diabetes mellitus (CMS/HCC)   • Status post right hip replacement   • FLORINA (obstructive sleep apnea)   • AAA (abdominal aortic aneurysm) without rupture (CMS/HCC)          Allergies   Allergen Reactions   • Invokamet [Canagliflozin-Metformin Hcl] Other (See Comments)     CAUSED PANCREATITIS          Discharge Medications      New Medications      Instructions Start Date   bisacodyl 5 MG EC tablet  Commonly known as:  DULCOLAX   10 mg, Oral, Daily PRN      docusate sodium 100 MG capsule   100 mg, Oral, 2 Times Daily PRN      ondansetron 4 MG tablet  Commonly known as:  ZOFRAN   4 mg, Oral, Every 6 Hours PRN      oxyCODONE-acetaminophen 7.5-325 MG per tablet  Commonly known as:  PERCOCET   1 tablet, Oral, Every 4 Hours PRN         Changes to Medications      Instructions Start Date   aspirin 81 MG EC tablet  What changed:    · when to take this  · additional instructions   81 mg, Oral, Every 12 Hours Scheduled         Continue These Medications      Instructions Start Date   atorvastatin 80 MG tablet  Commonly known as:  LIPITOR   40 mg, Oral, Daily      cetirizine 10 MG tablet  Commonly known as:  zyrTEC   10 mg, Oral, Daily      insulin aspart 100 UNIT/ML solution pen-injector sc pen  Commonly known as:  novoLOG FLEXPEN   Subcutaneous, 3 Times Daily With Meals, SLIDING SCALE        JARDIANCE 10 MG tablet  Generic drug:  Empagliflozin   1 tablet, Oral, Every Morning      lisinopril-hydrochlorothiazide 20-25 MG per tablet  Commonly known as:  PRINZIDE,ZESTORETIC   1 tablet, Oral, Daily      MULTIVITAMIN ADULT PO   1 tablet, Oral, Daily      testosterone 25 MG/2.5GM (1%) gel gel  Commonly known as:  ANDROGEL   20 mg, Transdermal, Daily      TRESIBA FLEXTOUCH SC   60 Units, Subcutaneous, Nightly      vitamin B-12 1000 MCG tablet  Commonly known as:  CYANOCOBALAMIN   1,000 mcg, Oral, Daily                  Past Medical History:   Diagnosis Date   • Arthritis    • Colon polyps    • Diabetes mellitus (CMS/HCC)    • History of pancreatitis    • Hyperlipidemia    • Hypertension    • Risk factors for obstructive sleep apnea     STOP BANG 7         Past Surgical History:   Procedure Laterality Date   • ANKLE SURGERY Left    • COLONOSCOPY W/ POLYPECTOMY     • TONSILLECTOMY     • TOTAL HIP ARTHROPLASTY Left 2018    Procedure: LT TOTAL HIP ARTHROPLASTY ANTERIOR WITH HANA TABLE;  Surgeon: Sharif Ruiz MD;  Location: [facility];  Service: Orthopedics   • TOTAL HIP ARTHROPLASTY Right 3/3/2020    Procedure: TOTAL HIP ARTHROPLASTY ANTERIOR WITH HANA TABLE;  Surgeon: Sharif Ruiz MD;  Location: [facility];  Service: Orthopedics;  Laterality: Right;        Social History     Occupational History   • Not on file   Tobacco Use   • Smoking status: Former Smoker     Packs/day: 1.50     Years: 20.00     Pack years: 30.00     Types: Cigarettes     Last attempt to quit:      Years since quittin.1   • Smokeless tobacco: Never Used   Substance and Sexual Activity   • Alcohol use: Yes     Comment: SOCIAL   • Drug use: No   • Sexual activity: Not on file      Social History     Social History Narrative   • Not on file        Family History   Problem Relation Age of Onset   • Malig Hyperthermia Neg Hx        Physical Exam: 69 y.o. male  General Appearance:    Alert,  cooperative, in no acute distress                      Vitals:    03/04/20 0300 03/04/20 0655 03/04/20 0730 03/04/20 1100   BP: 123/58 124/53 123/59    BP Location: Left arm Right arm Right arm    Patient Position: Lying Lying Sitting    Pulse: 94 96 97    Resp: 20 20 18    Temp: 99 °F (37.2 °C) 97.8 °F (36.6 °C) 99 °F (37.2 °C) Comment: RN stated vitals not needed   TempSrc: Oral Oral Oral    SpO2: 98% 97% 93%    Weight:       Height:            Hospital Course:  69 y.o. male admitted to [facility] to services of Sharif Ruiz,Baljit with Primary osteoarthritis of right hip [M16.11]  Primary osteoarthritis of right hip [M16.11] on 3/3/2020 and underwent a RIGHT  total hip arthroplasty Per Sharif Ruiz MD. Antibiotic  Kefzol  every 8 hours and VTE prophylaxis  Aspirin  orally  were per SCIP protocols. Post-operatively the patient transferred to the post-operative floor where the patient underwent mobilization therapy that included active ROM exercises. Opioids were titrated to achieve appropriate pain management to allow for participation in mobilization exercises. Vital signs are now stable. The incision is intact without signs or symptoms of infection. Operative extremity neurovascular status remains intact.     Appropriate education re: incision care, activity levels, medications, hip dislocation precautions, and follow up visits was completed and all questions were answered.     The patient is now deemed stable for discharge.    DISCHARGE DISPOSITION AND PLAN:  The  Patient is being discharged home with home health for PT   2-3 X per week for 2-3 weeks and nursing care as needed.     DIAGNOSTIC TESTS:     Admission on 03/03/2020   Component Date Value Ref Range Status   • Glucose 03/03/2020 165* 70 - 130 mg/dL Final   • Glucose 03/03/2020 215* 70 - 130 mg/dL Final   • Glucose 03/03/2020 171* 70 - 130 mg/dL Final   • Glucose 03/03/2020 191* 70 - 130 mg/dL Final   • Glucose 03/03/2020 180*  70 - 130 mg/dL Final   • Glucose 03/04/2020 212* 65 - 99 mg/dL Final   • BUN 03/04/2020 20  8 - 23 mg/dL Final   • Creatinine 03/04/2020 1.06  0.76 - 1.27 mg/dL Final   • Sodium 03/04/2020 135* 136 - 145 mmol/L Final   • Potassium 03/04/2020 4.1  3.5 - 5.2 mmol/L Final   • Chloride 03/04/2020 100  98 - 107 mmol/L Final   • CO2 03/04/2020 21.7* 22.0 - 29.0 mmol/L Final   • Calcium 03/04/2020 7.8* 8.6 - 10.5 mg/dL Final   • eGFR Non African Amer 03/04/2020 69  >60 mL/min/1.73 Final   • BUN/Creatinine Ratio 03/04/2020 18.9  7.0 - 25.0 Final   • Anion Gap 03/04/2020 13.3  5.0 - 15.0 mmol/L Final   • WBC 03/04/2020 15.89* 3.40 - 10.80 10*3/mm3 Final   • RBC 03/04/2020 3.56* 4.14 - 5.80 10*6/mm3 Final   • Hemoglobin 03/04/2020 11.6* 13.0 - 17.7 g/dL Final   • Hematocrit 03/04/2020 33.7* 37.5 - 51.0 % Final   • MCV 03/04/2020 94.7  79.0 - 97.0 fL Final   • MCH 03/04/2020 32.6  26.6 - 33.0 pg Final   • MCHC 03/04/2020 34.4  31.5 - 35.7 g/dL Final   • RDW 03/04/2020 12.3  12.3 - 15.4 % Final   • RDW-SD 03/04/2020 43.1  37.0 - 54.0 fl Final   • MPV 03/04/2020 9.7  6.0 - 12.0 fL Final   • Platelets 03/04/2020 180  140 - 450 10*3/mm3 Final   • Neutrophil % 03/04/2020 82.1* 42.7 - 76.0 % Final   • Lymphocyte % 03/04/2020 7.7* 19.6 - 45.3 % Final   • Monocyte % 03/04/2020 9.3  5.0 - 12.0 % Final   • Eosinophil % 03/04/2020 0.0* 0.3 - 6.2 % Final   • Basophil % 03/04/2020 0.3  0.0 - 1.5 % Final   • Immature Grans % 03/04/2020 0.6* 0.0 - 0.5 % Final   • Neutrophils, Absolute 03/04/2020 13.05* 1.70 - 7.00 10*3/mm3 Final   • Lymphocytes, Absolute 03/04/2020 1.23  0.70 - 3.10 10*3/mm3 Final   • Monocytes, Absolute 03/04/2020 1.47* 0.10 - 0.90 10*3/mm3 Final   • Eosinophils, Absolute 03/04/2020 0.00  0.00 - 0.40 10*3/mm3 Final   • Basophils, Absolute 03/04/2020 0.04  0.00 - 0.20 10*3/mm3 Final   • Immature Grans, Absolute 03/04/2020 0.10* 0.00 - 0.05 10*3/mm3 Final   • nRBC 03/04/2020 0.0  0.0 - 0.2 /100 WBC Final   • Glucose  03/04/2020 216* 70 - 130 mg/dL Final   • Glucose 03/04/2020 228* 70 - 130 mg/dL Final     No results found for: URICACID  No results found for: CRYSTAL  Microbiology Results (last 10 days)     ** No results found for the last 240 hours. **        No radiology results for the last 7 days      Follow-up Appointments  No future appointments.      Discharge and Follow up Instructions:     I. ACTIVITIES:  1. Exercises:  ? Complete exercise program as taught by the hospital physical therapist 2 times per day  ? Exercise program will be advanced by the physical therapist  ? During the day be up ambulating every 2 hours (while awake) for short distances  ? Complete the ankle pump exercises at least 10 times per hour (while awake)  ? Elevate legs when in bed and for at least 30 minutes during the day.Use cold packs 20-30 minutes approximately 5 times per day. This should be done before and after completing your exercises and at any time you are experiencing pain/ stiffness in your operative extremity.      2. Activities of Daily Living:  ? No tub baths, hot tubs, or swimming pools for 4 weeks  ? May shower and let water run over the incision on post-operative day #5 if no drainage. Do not scrub or rub the incision. Simply let the water run over the incision and pat dry.    II. Restrictions  ? Continue  Anterior hip precautions as taught at the hospital  ? Your surgeon will discuss with you when you will be able to drive again. Usual guidelines are you are to be off pain medications prior to driving.  ? Weight bearing is as tolerated  ? First week stay inside on even terrain. May go up and down stairs one stair at a time utilizing the hand rail once cleared by physical therapy to do so.  ? After one week, you may venture outside (if cleared to do so by physical therapist).    III. Precautions:  ? Everyone that comes near you should wash their hands  ? No elective dental, genital-urinary, or colon procedures or surgical  procedures for 12 weeks after surgery unless absolutely necessary.  ?  If dental work or surgical procedure is deemed absolutely necessary, you will need to contact your surgeon as you will need to take antibiotics 1 hour prior to any dental work (including teeth cleanings).  ? Please discuss with your surgeon prophylactic antibiotics as the length of time this intervention will be necessary for you varies with each patient’s health history and situation.  ? Avoid sick people. If you must be around someone who is ill, they should wear a mask.  ? Avoid visits to the Emergency Room or Urgent Care. If you feel you need to go to the Emergency Room, please notify your Surgeon's office.  ? Stockings are to be worn for one week after surgery and are to be placed on in the morning and removed at night. Observe your skin when stocking is removed for any problems. Monitor the stockings to ensure that any swelling is not causing the stockings to become too tight. In this case, remove stockings immediately.      IV. INCISION CARE:  ? Wash your hands prior to dressing changes  ? Change the dressing as needed to keep incision clean and dry. Utilize dry gauze and paper tape. Avoid touching the side of the gauze that goes against the incision with your hands.  ? No creams or ointments to the incision  ? May remove dressing once the incision is free of drainage  ? Do not touch or pick at the incision  ? Check incision every day and notify surgeon immediately if any of the following signs or symptoms are noted:  o Increase in redness  o Increase in swelling around the incision and of the entire extremity  o Increase in pain  o Drainage oozing from the incision  o Pulling apart of the edges of the incision  o Increase in overall body temperature (greater than 100.5 degrees)    ?  You have absorbable sutures with steristrips, please do not remove the  steristrips for 14 days, you can shower on them 6 days after surgery.       V.  Medications:   1. Anticoagulants: You will be discharged on an anticoagulant. This is a prophylactic medication that helps prevent blood clots during your post-operative period.  You will be on Aspirin  81 mg twice daily for 30 days. If you were on Aspirin 81 mg prior to surgery you can go back to home dose once the 30 days are completed.     ? While taking the anticoagulant, you should avoid taking any additional aspirin, ibuprofen (Advil or Motrin), Aleve (Naprosyn) or other non-steroidal anti-inflammatory medications.   ? Notify surgeon immediately if any nate bleeding is noted in the urine, stool, emesis, or from the nose or the incision. Blood in the stool will often appear as black rather than red. Blood in urine may appear as pink. Blood in emesis may appear as brown/black like coffee grounds.  ? You will need to apply pressure for longer periods of time to any cuts or abrasions to stop bleeding  ? Avoid alcohol while taking anticoagulants    2. Stool Softeners: You will be at greater risk of constipation after surgery due to being less mobile and the pain medications.   ? Take stool softeners as instructed by your surgeon while on pain medications. Over the counter Colace 100 mg 1-2 capsules twice daily.   ? If stools become too loose or too frequent, please decreases the dosage or stop the stool softener.  ? If constipation occurs despite use of stool softeners, you are to continue the stool softeners and add a laxative (Milk of Magnesia 1 ounce daily as needed).  ? Dulcolax oral tabs or suppository, or a fleets enema can also be utilized for constipation and can be obtained over the counter.   ? If above interventions are unsuccessful in inducing bowel movements, please contact your surgeon's office / family physician's office.  ? Drink plenty of fluids, and eat fruits and vegetables during your recovery time    3. Pain Medications utilized after surgery are narcotics and the law requires that the  following information be given to all patients that are prescribed narcotics:  ? CLASSIFICATION: Pain medications are called Opioids and are narcotics  ? LEGALITIES: It is illegal to share narcotics with others and to drive within 24 hours of taking narcotics  ? POTENTIAL SIDE EFFECTS: Potential side effects of opioids include: nausea, vomiting, itching, dizziness, drowsiness, dry mouth, constipation, and difficulty urinating.  ? POTENTIAL ADVERSE EFFECTS:   o Opioid tolerance can develop with use of pain medications and this simply means that it requires more and more of the medication to control pain; however, this is seen more in patients that use opioids for longer periods of time.  o Opioid dependence can develop with use of Opioids and this simply means that to stop the medication can cause withdrawal symptoms; however, this is seen with patients that use Opioids for longer periods of time.  o Opioid addiction can develop with use of Opioids and the incidence of this is very unlikely in patients who take the medications as ordered and stop the medications as instructed.  o Opioid overdose can be dangerous, but is unlikely when the medication is taken as ordered and stopped when ordered. It is important not to mix opioids with alcohol or with and type of sedative such as Benadryl as this can lead to over sedation and respiratory difficulty.  ? DOSAGE:   o Pain medications will need to be taken consistently for the first week to decrease pain and promote adequate pain relief and participation in physical therapy.  o After the initial surgical pain begins to resolve, you may begin to decrease the pain medication. By the end of 6 weeks, you should be off of pain medications.  o Refills will not be given by the office during evening hours, on weekends, or after 6 weeks post-op.  o To seek refills on pain medications during the initial 6 week post-operative period, you must call the office 48 hours in advance to  request the refill. The office will then notify you when to  the prescription. DO NOT wait until you are out of the medication to request a refill.    V. FOLLOW-UP VISITS:  ? You will need to follow up in the office with your surgeon on March 23 ,2020. Please call this number [phone]  to schedule this appointment.  If you have any concerns or suspected complications prior to your follow up visit, please call your surgeons office. Do not wait until your appointment time if you suspect complications. These will need to be addressed in the office promptly.    Date: 3/4/2020    Sharif Ruiz MD    CC: Elvira Yee MD; MD Sara Guzman, Sharif COTA,*

## 2020-03-04 NOTE — THERAPY TREATMENT NOTE
Patient Name: Vicente Cook  : 1950    MRN: [id number]                              Today's Date: 3/4/2020       Admit Date: 3/3/2020    Visit Dx:     ICD-10-CM ICD-9-CM   1. Primary osteoarthritis of right hip M16.11 715.15     Patient Active Problem List   Diagnosis   • Hypertension   • Type 2 diabetes mellitus (CMS/HCC)   • Status post right hip replacement   • FLORINA (obstructive sleep apnea)   • AAA (abdominal aortic aneurysm) without rupture (CMS/HCC)     Past Medical History:   Diagnosis Date   • Arthritis    • Colon polyps    • Diabetes mellitus (CMS/HCC)    • History of pancreatitis    • Hyperlipidemia    • Hypertension    • Risk factors for obstructive sleep apnea     STOP BANG 7      Past Surgical History:   Procedure Laterality Date   • ANKLE SURGERY Left    • COLONOSCOPY W/ POLYPECTOMY     • TONSILLECTOMY     • TOTAL HIP ARTHROPLASTY Left 2018    Procedure: LT TOTAL HIP ARTHROPLASTY ANTERIOR WITH HANA TABLE;  Surgeon: Sharif Ruiz MD;  Location: [facility];  Service: Orthopedics     General Information     Row Name 20 1047          PT Evaluation Time/Intention    Document Type  therapy note (daily note)  -     Mode of Treatment  physical therapy  -     Row Name 20 1047          General Information    Existing Precautions/Restrictions  fall;right;hip, anterior  -     Row Name 20 1047          Cognitive Assessment/Intervention- PT/OT    Orientation Status (Cognition)  oriented x 4  -       User Key  (r) = Recorded By, (t) = Taken By, (c) = Cosigned By    Initials Name Provider Type     Lisa Alex PTA Physical Therapy Assistant        Mobility     Row Name 20 1049          Bed Mobility Assessment/Treatment    Comment (Bed Mobility)  up in chair  -     Row Name 20 1049          Sit-Stand Transfer    Sit-Stand [place] (Transfers)  stand by assist  -     Assistive Device (Sit-Stand Transfers)  walker, front-wheeled   -[facility] Name 03/04/20 1049          Gait/Stairs Assessment/Training    [place] Level (Gait)  stand by assist  -     Assistive Device (Gait)  walker, front-wheeled  -     Distance in Feet (Gait)  140  -SM     Pattern (Gait)  step-through  -SM     Deviations/Abnormal Patterns (Gait)  tom decreased;stride length decreased;antalgic  -SM     Bilateral Gait Deviations  forward flexed posture  -     [place] Level (Stairs)  contact guard  -     Handrail Location (Stairs)  both sides  -     Number of Steps (Stairs)  4  -SM     Ascending Technique (Stairs)  step-to-step  -SM     Descending Technique (Stairs)  step-to-step  -SM       User Key  (r) = Recorded By, (t) = Taken By, (c) = Cosigned By    Initials Name Provider Type    Lisa Chen PTA Physical Therapy Assistant        Obj/Interventions     [facility] Name 03/04/20 1050          Therapeutic Exercise    Comment (Therapeutic Exercise)  R THR protocol x15 reps  -       User Key  (r) = Recorded By, (t) = Taken By, (c) = Cosigned By    Initials Name Provider Type    Lisa Chen PTA Physical Therapy Assistant        Goals/Plan    No documentation.       Clinical Impression     [facility] Name 03/04/20 1050          Pain Assessment    Additional Documentation  Pain Scale: Numbers Pre/Post-Treatment (Group)  -SM     Row Name 03/04/20 1050          Pain Scale: Numbers Pre/Post-Treatment    Pain Scale: Numbers, Pretreatment  0/10 - no pain  -     Pain Scale: Numbers, Post-Treatment  8/10  -SM     Pain Location - Side  Right  -SM     Pain Location  hip  -     Pain Intervention(s)  Repositioned;Ambulation/increased activity;Rest  -[facility] Name 03/04/20 1050          Positioning and Restraints    Pre-Treatment Position  sitting in chair/recliner  -     Post Treatment Position  chair  -SM     In Chair  reclined;call light within reach;encouraged to call for assist;with family/caregiver  -       User Key  (r) = Recorded By, (t) =  Taken By, (c) = Cosigned By    Initials Name Provider Type    Lisa Chen PTA Physical Therapy Assistant        Outcome Measures     Row Name 03/04/20 1051          How much help from another person do you currently need...    Turning from your back to your side while in flat bed without using bedrails?  4  -SM     Moving from lying on back to sitting on the side of a flat bed without bedrails?  3  -SM     Moving to and from a bed to a chair (including a wheelchair)?  3  -SM     Standing up from a chair using your arms (e.g., wheelchair, bedside chair)?  4  -SM     Climbing 3-5 steps with a railing?  3  -SM     To walk in hospital room?  3  -SM     AM-PAC 6 Clicks Score (PT)  20  -SM     Row Name 03/04/20 1051          Functional Assessment    Outcome Measure Options  AM-PAC 6 Clicks Basic Mobility (PT)  -       User Key  (r) = Recorded By, (t) = Taken By, (c) = Cosigned By    Initials Name Provider Type    Lisa Chen PTA Physical Therapy Assistant          PT Recommendation and Plan     Outcome Summary/Treatment Plan (PT)  Anticipated Discharge Disposition (PT): home with home health  Plan of Care Reviewed With: patient  Progress: improving  Outcome Summary: Pt tolerated treatment well this date. Ambulated 140ft w/ Rw and SBA, then completed R THR protocol. Also completed stairs w/ CGA for safety. Pt progressing very well. Safe to d/c home w/ HH from PT standpoint.     Time Calculation:   PT Charges     Row Name 03/04/20 1053             Time Calculation    Start Time  0840  -      Stop Time  0902  -      Time Calculation (min)  22 min  -      PT Received On  03/04/20  -      PT - Next Appointment  03/04/20  -        User Key  (r) = Recorded By, (t) = Taken By, (c) = Cosigned By    Initials Name Provider Type    Lisa Chen PTA Physical Therapy Assistant        Therapy Charges for Today     Code Description Service Date Service Provider Modifiers Qty    30706662899  HC PT THER PROC EA 15 MIN 3/4/2020 Lisa Alex, PTA GP 1    11523343115 HC PT THER PROC GROUP 3/4/2020 Lisa Alex, PTA GP 1          PT G-Codes  Outcome Measure Options: AM-PAC 6 Clicks Basic Mobility (PT)  AM-PAC 6 Clicks Score (PT): 20    Lisa Alex, LIZBETH  3/4/2020

## 2020-03-04 NOTE — PROGRESS NOTES
Continued Stay Note  [facility]     Patient Name: Vicente Cook  MRN: [id number]  Today's Date: 3/4/2020    Admit Date: 3/3/2020    Discharge Plan     Row Name 03/04/20 1643       Plan    Plan  [facility]    Provided Post Acute Provider List?  Yes    Post Acute Provider List  Home Health    Provided Post Acute Provider Quality & Resource List?  Yes    Post Acute Provider Quality and Resource List  Home Health    Delivered To  Patient    Method of Delivery  In person    Patient/Family in Agreement with Plan  yes    Plan Comments  Spoke with pt, verified correct information on facesheet and explained the role of CCP. Pt would like to d/c home with [facility], referral given to Leslie with [facility] who states they are able to accept. Plan will be to d/c home with [facility] and family support. No other needs identified.     Final Discharge Disposition Code  06 - home with home health care    Final Note  Pt d/c'ed home with [facility].        Discharge Codes    No documentation.       Expected Discharge Date and Time     Expected Discharge Date Expected Discharge Time    Mar 4, 2020             Caro Avila RN

## 2020-03-04 NOTE — PROGRESS NOTES
Patient: Vicente Cook  YOB: 1950     Date of Admission: 3/3/2020  6:09 AM Medical Record Number: [id number]     Attending Physician: Sharif Ruiz,*    Procedure(s):  TOTAL HIP ARTHROPLASTY ANTERIOR WITH HANA TABLE Post Operative Day Number: 1    Subjective : No new orthopaedic complaints     Pain Relief: some relief with present medication.     Systemic Complaints: No Complaints  Vitals:    03/04/20 0300 03/04/20 0655 03/04/20 0730 03/04/20 1100   BP: 123/58 124/53 123/59    BP Location: Left arm Right arm Right arm    Patient Position: Lying Lying Sitting    Pulse: 94 96 97    Resp: 20 20 18    Temp: 99 °F (37.2 °C) 97.8 °F (36.6 °C) 99 °F (37.2 °C) Comment: RN stated vitals not needed   TempSrc: Oral Oral Oral    SpO2: 98% 97% 93%    Weight:       Height:           Physical Exam: 69 y.o. male    General Appearance:       Alert, cooperative, in no acute distress                  Extremities:    Dressing Clean, Dry and Intact         Incision healthy without signs or symptoms of infections         No clinical sign of DVT        Able to do good movements of digits    Pulses:  Pulses palpable and equal bilaterally           Diagnostic Tests:     Results from last 7 days   Lab Units 03/04/20  0341   WBC 10*3/mm3 15.89*   HEMOGLOBIN g/dL 11.6*   HEMATOCRIT % 33.7*   PLATELETS 10*3/mm3 180     Results from last 7 days   Lab Units 03/04/20  0341   SODIUM mmol/L 135*   POTASSIUM mmol/L 4.1   CHLORIDE mmol/L 100   CO2 mmol/L 21.7*   BUN mg/dL 20   CREATININE mg/dL 1.06   GLUCOSE mg/dL 212*   CALCIUM mg/dL 7.8*         No results found for: CRP  No results found for: SEDRATE  No results found for: URICACID  No results found for: CRYSTAL  Microbiology Results (last 10 days)     ** No results found for the last 240 hours. **        No radiology results for the last 7 days          Current Medications:  Scheduled Meds:  acetaminophen 1,000 mg Oral Q8H   aspirin 81 mg Oral Q12H   insulin  glargine 30 Units Subcutaneous Nightly   insulin lispro 0-9 Units Subcutaneous 4x Daily With Meals & Nightly   insulin lispro 31.5 Units Subcutaneous TID With Meals   lisinopril-hydroCHLOROthiazide (ZESTORETIC) 10-12.5 mg combo dose  Oral Q24H     Continuous Infusions:  sodium chloride 100 mL/hr Last Rate: Stopped (03/03/20 1959)     PRN Meds:.bisacodyl  •  bisacodyl  •  dextrose  •  dextrose  •  docusate sodium  •  glucagon (human recombinant)  •  HYDROmorphone **AND** naloxone  •  melatonin  •  ondansetron **OR** ondansetron  •  oxyCODONE-acetaminophen    Assessment:    Procedure(s):  TOTAL HIP ARTHROPLASTY ANTERIOR WITH HANA TABLE    Patient Active Problem List   Diagnosis   • Hypertension   • Type 2 diabetes mellitus (CMS/HCC)   • Status post right hip replacement   • FLORINA (obstructive sleep apnea)   • AAA (abdominal aortic aneurysm) without rupture (CMS/HCC)       PLAN:   Continues current post-op course  Anticoagulation: Aspirin started  Dressing Change prn  Mobilize with PT as tolerated per protocol    Weight Bearing: WBAT  Discharge Plan: OK to plan for discharge in  today to home and home health  from orthopadic perspective.      Sharif Ruiz MD    Date: 3/4/2020    Time: 2:08 PM

## 2020-03-04 NOTE — PROGRESS NOTES
" LOS: 1 day     Name: Vicente Cook  Age: 69 y.o.  Sex: male  :  1950  MRN: [id number]         Primary Care Physician: Elvira Yee MD    Subjective   Subjective  Patient states his pain has been an \"11 out of 10\".  Better relief after pain medications adjusted by primary service.  Blood sugar and blood pressure have been reasonably controlled postoperatively.    Objective   Vital Signs  Temp:  [97.5 °F (36.4 °C)-99 °F (37.2 °C)] 99 °F (37.2 °C)  Heart Rate:  [62-98] 97  Resp:  [16-20] 18  BP: ()/(50-80) 123/59  Body mass index is 30.46 kg/m².    Objective:  General Appearance:  Comfortable and in no acute distress.    Vital signs: (most recent): Blood pressure 123/59, pulse 97, temperature 99 °F (37.2 °C), temperature source Oral, resp. rate 18, height 185.4 cm (73\"), weight 105 kg (230 lb 14.4 oz), SpO2 93 %.    Lungs:  Normal effort and normal respiratory rate.    Heart: Normal rate.  Regular rhythm.    Abdomen: Abdomen is soft.  Bowel sounds are normal.   There is no abdominal tenderness.     Extremities: There is no dependent edema or local swelling.    Neurological: Patient is alert and oriented to person, place and time.    Skin:  Warm and dry.              Results Review:       I reviewed the patient's new clinical results.    Results from last 7 days   Lab Units 20  0341   WBC 10*3/mm3 15.89*   HEMOGLOBIN g/dL 11.6*   PLATELETS 10*3/mm3 180     Results from last 7 days   Lab Units 20  0341   SODIUM mmol/L 135*   POTASSIUM mmol/L 4.1   CHLORIDE mmol/L 100   CO2 mmol/L 21.7*   BUN mg/dL 20   CREATININE mg/dL 1.06   CALCIUM mg/dL 7.8*   GLUCOSE mg/dL 212*                 Scheduled Meds:     acetaminophen 1,000 mg Oral Q8H   aspirin 81 mg Oral Q12H   insulin glargine 30 Units Subcutaneous Nightly   insulin lispro 0-9 Units Subcutaneous 4x Daily With Meals & Nightly   insulin lispro 31.5 Units Subcutaneous TID With Meals   lisinopril-hydroCHLOROthiazide (ZESTORETIC) 10-12.5 " mg combo dose  Oral Q24H     PRN Meds:   bisacodyl  •  bisacodyl  •  dextrose  •  dextrose  •  docusate sodium  •  glucagon (human recombinant)  •  HYDROcodone-acetaminophen  •  HYDROmorphone **AND** naloxone  •  melatonin  •  ondansetron **OR** ondansetron  •  oxyCODONE-acetaminophen  Continuous Infusions:    sodium chloride 100 mL/hr Last Rate: Stopped (03/03/20 1959)       Assessment/Plan   Active Hospital Problems    Diagnosis  POA   • Status post right hip replacement [Z96.641]  Not Applicable   • FLORINA (obstructive sleep apnea) [G47.33]  Unknown   • AAA (abdominal aortic aneurysm) without rupture (CMS/[facility]) [I71.4]  Unknown   • Hypertension [I10]  Yes   • Type 2 diabetes mellitus (CMS/HCC) [E11.9]  Yes      Resolved Hospital Problems    Diagnosis Date Resolved POA   • **Primary osteoarthritis of right hip [M16.11] 03/03/2020 Unknown       Assessment & Plan    -Blood pressure well controlled at this time.  He can resume his outpatient regimen upon discharge  -Currently on an attenuated dose of his normal insulin regimen.  Blood sugars are starting to creep up into the low 200 range.  He can resume his normal regimen upon discharge.  -No objection to discharge today from my standpoint.  He should follow-up with his primary care physician and endocrinologist within the next couple of weeks.      Jeremiah Stein MD  [facility]  03/04/20  9:13 AM

## 2020-03-10 ENCOUNTER — NURSE TRIAGE (OUTPATIENT)
Dept: CALL CENTER | Facility: HOSPITAL | Age: 70
End: 2020-03-10

## 2020-03-10 NOTE — TELEPHONE ENCOUNTER
"Info per AVS discharge note.     Reason for Disposition  • Information only question and nurse able to answer    Additional Information  • Negative: Nursing judgment  • Negative: Nursing judgment  • Negative: Nursing judgment  • Negative: Nursing judgment    Answer Assessment - Initial Assessment Questions  1. REASON FOR CALL or QUESTION: \"What is your reason for calling today?\" or \"How can I best help you?\" or \"What question do you have that I can help answer?\"      See note    Protocols used: INFORMATION ONLY CALL - NO TRIAGE-ADULT-[place]      "

## 2020-03-14 ENCOUNTER — NURSE TRIAGE (OUTPATIENT)
Dept: CALL CENTER | Facility: HOSPITAL | Age: 70
End: 2020-03-14

## 2020-03-14 NOTE — TELEPHONE ENCOUNTER
States a week ago Tuesday he had hip surgery. States he was on Percocet. States he was doing well, finished PT, no longer using his walker, etc. States he stopped his pain pills after being on them every four hours since surgery. States today he is nauseated, weak, shaky, and feels really bad. States he is having to use his walker again. Asking what to do? Discussed may be due to some slight withdrawal symptoms due to cold turkey stopping medication after taking it consistently. Suggested taking dose of pain med, see if s/s improve. If they do to take half a pill tomorrow and try to wean off until Monday. If no improvement to call back. He verbalized understanding.     Reason for Disposition  • Other post-op symptom or question    Additional Information  • Negative: Sounds like a life-threatening emergency to the triager  • Negative: Chest pain  • Negative: Difficulty breathing  • Negative: Surgical incision symptoms and questions  • Negative: [1] Discomfort (pain, burning or stinging) when passing urine AND [2] male  • Negative: [1] Discomfort (pain, burning or stinging) when passing urine AND [2] female  • Negative: Constipation  • Negative: New or worsening leg (calf, thigh) pain  • Negative: New or worsening leg swelling  • Negative: Dizziness is severe, or persists > 24 hours after surgery  • Negative: Pain, redness, swelling, or pus at IV Site  • Negative: Symptoms arising from use of a urinary catheter (Jimenes or Coude)  • Negative: Cast problems or questions  • Negative: Medication question  • Negative: [1] Widespread rash AND [2] bright red, sunburn-like  • Negative: [1] SEVERE headache AND [2] after spinal (epidural) anesthesia  • Negative: [1] Vomiting AND [2] persists > 4 hours  • Negative: [1] Vomiting AND [2] abdomen looks much more swollen than usual  • Negative: [1] Drinking very little AND [2] dehydration suspected (e.g., no urine > 12 hours, very dry mouth, very lightheaded)  • Negative: Patient  "sounds very sick or weak to the triager  • Negative: Sounds like a serious complication to the triager  • Negative: Fever > 100.4 F (38.0 C)  • Negative: [1] SEVERE post-op pain (e.g., excruciating, pain scale 8-10) AND [2] not controlled with pain medications  • Negative: [1] Caller has URGENT question AND [2] triager unable to answer question  • Negative: [1] Headache AND [2] after spinal (epidural) anesthesia AND [3] not severe  • Negative: Fever present > 3 days (72 hours)  • Negative: [1] MILD-MODERATE post-op pain (e.g., pain scale 1-7) AND [2] not controlled with pain medications  • Negative: [1] Caller has NON-URGENT question AND [2] triager unable to answer question  • Negative: General activity, questions about  • Negative: Resuming driving, questions about  • Negative: Resuming sexual relations, questions about  • Negative: Getting the incision wet, questions about  • Negative: Throat pain after surgery, questions about  • Negative: [1] Vomiting AND [2] present < 4 hours    Answer Assessment - Initial Assessment Questions  1. SYMPTOM: \"What's the main symptom you're concerned about?\" (e.g., pain, fever, vomiting)      See note  2. ONSET: \"When did n/a  start?\"      n/a  3. SURGERY: \"What surgery was performed?\"      n/a  4. DATE of SURGERY: \"When was surgery performed?\"       n/a  5. ANESTHESIA: \" What type of anesthesia did you have?\" (e.g., general, spinal, epidural, local)      n/a  6. PAIN: \"Is there any pain?\" If so, ask: \"How bad is it?\"  (Scale 1-10; or mild, moderate, severe)      n/a  7. FEVER: \"Do you have a fever?\" If so, ask: \"What is your temperature, how was it measured, and when did it start?\"      n/a  8. VOMITING: \"Is there any vomiting?\" If yes, ask: \"How many times?\"      n/a  9. BLEEDING: \"Is there any bleeding?\" If so, ask: \"How much?\" and \"Where?\"      n/a  10. OTHER SYMPTOMS: \"Do you have any other symptoms?\" (e.g., drainage from wound, painful urination, constipation)        " N/a    Protocols used: POST-OP SYMPTOMS AND QUESTIONS-ADULT-

## 2020-09-04 ENCOUNTER — TELEPHONE (OUTPATIENT)
Dept: GASTROENTEROLOGY | Facility: CLINIC | Age: 70
End: 2020-09-04

## 2020-09-04 NOTE — TELEPHONE ENCOUNTER
Patient called and saw Dr. Espino at [facility]. We have the JOSE G in the patients chart. He is wanting to schedule his 3 year cls. Patient will be available after 3pm today.

## 2020-09-11 ENCOUNTER — PREP FOR SURGERY (OUTPATIENT)
Dept: OTHER | Facility: HOSPITAL | Age: 70
End: 2020-09-11

## 2020-09-11 DIAGNOSIS — Z12.11 COLON CANCER SCREENING: ICD-10-CM

## 2020-09-11 DIAGNOSIS — Z86.010 HX OF ADENOMATOUS COLONIC POLYPS: Primary | ICD-10-CM

## 2020-09-21 ENCOUNTER — LAB REQUISITION (OUTPATIENT)
Dept: LAB | Facility: HOSPITAL | Age: 70
End: 2020-09-21

## 2020-09-21 DIAGNOSIS — Z00.00 ENCOUNTER FOR GENERAL ADULT MEDICAL EXAMINATION WITHOUT ABNORMAL FINDINGS: ICD-10-CM

## 2020-09-21 PROCEDURE — U0004 COV-19 TEST NON-CDC HGH THRU: HCPCS | Performed by: INTERNAL MEDICINE

## 2020-09-22 LAB — SARS-COV-2 RNA RESP QL NAA+PROBE: NOT DETECTED

## 2020-09-23 ENCOUNTER — LAB REQUISITION (OUTPATIENT)
Dept: LAB | Facility: HOSPITAL | Age: 70
End: 2020-09-23

## 2020-09-23 ENCOUNTER — OUTSIDE FACILITY SERVICE (OUTPATIENT)
Dept: GASTROENTEROLOGY | Facility: CLINIC | Age: 70
End: 2020-09-23

## 2020-09-23 DIAGNOSIS — Z12.11 ENCOUNTER FOR SCREENING FOR MALIGNANT NEOPLASM OF COLON: ICD-10-CM

## 2020-09-23 PROCEDURE — 88305 TISSUE EXAM BY PATHOLOGIST: CPT | Performed by: INTERNAL MEDICINE

## 2020-09-23 PROCEDURE — 45385 COLONOSCOPY W/LESION REMOVAL: CPT | Performed by: INTERNAL MEDICINE

## 2020-09-24 LAB
CYTO UR: NORMAL
LAB AP CASE REPORT: NORMAL
LAB AP CLINICAL INFORMATION: NORMAL
PATH REPORT.FINAL DX SPEC: NORMAL
PATH REPORT.GROSS SPEC: NORMAL

## 2020-09-25 ENCOUNTER — TELEPHONE (OUTPATIENT)
Dept: GASTROENTEROLOGY | Facility: CLINIC | Age: 70
End: 2020-09-25

## 2021-03-19 ENCOUNTER — BULK ORDERING (OUTPATIENT)
Dept: CASE MANAGEMENT | Facility: OTHER | Age: 71
End: 2021-03-19

## 2021-03-19 DIAGNOSIS — Z23 IMMUNIZATION DUE: ICD-10-CM

## 2022-02-08 ENCOUNTER — OFFICE VISIT (OUTPATIENT)
Dept: GASTROENTEROLOGY | Facility: CLINIC | Age: 72
End: 2022-02-08

## 2022-02-08 VITALS
TEMPERATURE: 98.6 F | WEIGHT: 256 LBS | HEIGHT: 73 IN | BODY MASS INDEX: 33.93 KG/M2 | SYSTOLIC BLOOD PRESSURE: 142 MMHG | OXYGEN SATURATION: 98 % | HEART RATE: 88 BPM | DIASTOLIC BLOOD PRESSURE: 60 MMHG

## 2022-02-08 DIAGNOSIS — Z86.010 HX OF ADENOMATOUS COLONIC POLYPS: ICD-10-CM

## 2022-02-08 DIAGNOSIS — K76.0 FATTY LIVER: ICD-10-CM

## 2022-02-08 DIAGNOSIS — K59.00 CONSTIPATION, UNSPECIFIED CONSTIPATION TYPE: ICD-10-CM

## 2022-02-08 DIAGNOSIS — R10.9 LEFT SIDED ABDOMINAL PAIN: Primary | ICD-10-CM

## 2022-02-08 DIAGNOSIS — Z12.11 COLON CANCER SCREENING: ICD-10-CM

## 2022-02-08 PROCEDURE — 99214 OFFICE O/P EST MOD 30 MIN: CPT | Performed by: NURSE PRACTITIONER

## 2022-02-08 RX ORDER — LOSARTAN POTASSIUM AND HYDROCHLOROTHIAZIDE 25; 100 MG/1; MG/1
1 TABLET ORAL DAILY
COMMUNITY
Start: 2021-12-14 | End: 2022-12-14

## 2022-02-08 RX ORDER — MULTIVIT WITH MINERALS/LUTEIN
1 TABLET ORAL DAILY
COMMUNITY

## 2022-02-08 RX ORDER — ROPINIROLE 0.5 MG/1
TABLET, FILM COATED ORAL
COMMUNITY
Start: 2021-11-09

## 2022-02-08 RX ORDER — DAPAGLIFLOZIN AND METFORMIN HYDROCHLORIDE 5; 1000 MG/1; MG/1
1 TABLET, FILM COATED, EXTENDED RELEASE ORAL DAILY
COMMUNITY

## 2022-02-08 RX ORDER — EZETIMIBE 10 MG/1
10 TABLET ORAL DAILY
COMMUNITY
Start: 2021-09-01 | End: 2022-09-01

## 2022-02-08 RX ORDER — DIPHENOXYLATE HYDROCHLORIDE AND ATROPINE SULFATE 2.5; .025 MG/1; MG/1
1 TABLET ORAL DAILY
COMMUNITY

## 2022-02-08 RX ORDER — ATORVASTATIN CALCIUM 80 MG/1
80 TABLET, FILM COATED ORAL DAILY
COMMUNITY
Start: 2022-01-26 | End: 2023-01-26

## 2022-02-08 NOTE — PROGRESS NOTES
Chief Complaint   Patient presents with   • Abdominal Pain     left side   • Constipation         History of Present Illness  71-year-old male presents the office today for follow-up.  He presents with reports of left-sided abdominal pain. He reports that symptoms started approximately 3 months ago and are mild. He states that if he lays on his left side that the discomfort is present. He states that the discomfort will pass after he has a bowel movement. Symptoms generally occur once per day. He denies any weight loss.He reports a new onset of constipation over the past 3 to 4 months with straining and hard stools. He does not feel that he maintains adequate fluid hydration. He has used a stool softener in the past but could not recall response. He reports that fiber supplementation caused excess gas and he discontinued use. He states he does maintain a high-fiber diet with raw vegetables and nuts. 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 He does report following up with this with a specialist. He was noted to have diverticulosis without acute diverticulitis. No acute abnormalities noted.    Last colonoscopy performed on 9/23/2020 revealed internal hemorrhoids, diverticulosis, and one 7 mm colon polyp, which is identified as a tubular adenoma.  He was recommended undergo surveillance colonoscopy to 5-year interval, which will be due on 9/23/2025.    Review of Systems   Constitutional: Positive for unexpected weight change. Negative for fever.   HENT: Negative for trouble swallowing.    Cardiovascular: Negative for chest pain.   Gastrointestinal: Positive for abdominal pain and constipation. Negative for abdominal distention, anal bleeding, blood in stool, diarrhea, nausea, rectal pain and vomiting.  "     Result Review :       SCANNED - COLONOSCOPY (09/23/2020)  Tissue Pathology Exam (09/23/2020 08:42)  COMPREHENSIVE METABOLIC PANEL (09/11/2020 08:29)    Vital Signs:   /60   Pulse 88   Temp 98.6 °F (37 °C)   Ht 185.4 cm (73\")   Wt 116 kg (256 lb)   SpO2 98%   BMI 33.78 kg/m²     Body mass index is 33.78 kg/m².     Physical Exam  Vitals reviewed.   Constitutional:       Appearance: Normal appearance.   Pulmonary:      Effort: Pulmonary effort is normal. No respiratory distress.   Abdominal:      General: Abdomen is flat. Bowel sounds are normal. There is no distension.      Palpations: Abdomen is soft. There is no mass.      Tenderness: There is no abdominal tenderness. There is no guarding.   Musculoskeletal:         General: Normal range of motion.   Skin:     General: Skin is warm and dry.   Neurological:      General: No focal deficit present.      Mental Status: He is alert and oriented to person, place, and time.   Psychiatric:         Mood and Affect: Mood normal.         Behavior: Behavior normal.         Thought Content: Thought content normal.         Judgment: Judgment normal.       Assessment and Plan    Diagnoses and all orders for this visit:    1. Left sided abdominal pain (Primary)  Comments:  New and undiagnosed problem    2. Hx of adenomatous colonic polyps    3. Colon cancer screening    4. Constipation, unspecified constipation type  Comments:  New diagnosis    5. Fatty liver  Comments:  New diagnosis           Patient Instructions   1. For fatty liver, the recommended treatment is weight loss along with regular exercise, avoidance of foods containing high fructose corn syrup, and alcohol avoidance.  We also recommend daily supplementation with milk thistle.  This is an antioxidant that supports liver health and is available over-the-counter at your local grocery or pharmacy.    You will be having lab work performed at your upcoming wellness physical and I will check your liver " enzymes at that time.  We will plan to check liver enzymes every 6 months for ongoing monitoring of fatty liver.    2.  For constipation, we recommend that you maintain hydration with at least 8 full glasses of water per day.  If you find that increasing your fluid intake does not soften your stool, we would recommend an over-the-counter stool softener called Colace 100 mg.  You may take 1 capsule 1-2 times daily based upon how your bowel habits respond.    3.  Continue to maintain a high-fiber diet due to your history of diverticulosis.    4.  Your history of colon polyps, you are currently recall for your next surveillance colonoscopy in 2025.    5.  We will plan for office follow-up in 6 months for reassessment of symptoms, or sooner should symptoms worsen or fail to improve.        Discussion:    For fatty liver, we recommended lifestyle modifications.  Patient will have a hepatic function panel drawn the end of this month at his wellness we will plan to recheck again in 6 months at his next office follow-up.  We have recommended that he implement milk thistle supplementation daily and have also recommended weight loss.    Abdominal pain seems to be secondary to splenic flexure syndrome, and we have recommended the patient increase his fluid intake as he does not feel he is maintaining adequate hydration.  If this is ineffective we have recommended a daily stool softener.  Patient to continue high-fiber diet.  CT scan was unremarkable for any acute process in the abdomen to account for any abdominal discomfort.  And being that his abdominal pain subsides following a bowel movement, the likely source is his constipation and associated splenic flexure syndrome.  We will plan for office follow-up in 6 months for reassessment of symptoms, or sooner should symptoms worsen or fail to improve.  Patient verbalized understand above plan of care and is in agreement.  All questions answered and support provided.       EMR  Dragon/Transcription Disclaimer:  This document has been Dictated utilizing Dragon dictation.

## 2022-02-08 NOTE — PATIENT INSTRUCTIONS
1. For fatty liver, the recommended treatment is weight loss along with regular exercise, avoidance of foods containing high fructose corn syrup, and alcohol avoidance.  We also recommend daily supplementation with milk thistle.  This is an antioxidant that supports liver health and is available over-the-counter at your local grocery or pharmacy.    You will be having lab work performed at your upcoming wellness physical and I will check your liver enzymes at that time.  We will plan to check liver enzymes every 6 months for ongoing monitoring of fatty liver.    2.  For constipation, we recommend that you maintain hydration with at least 8 full glasses of water per day.  If you find that increasing your fluid intake does not soften your stool, we would recommend an over-the-counter stool softener called Colace 100 mg.  You may take 1 capsule 1-2 times daily based upon how your bowel habits respond.    3.  Continue to maintain a high-fiber diet due to your history of diverticulosis.    4.  Your history of colon polyps, you are currently recall for your next surveillance colonoscopy in 2025.    5.  We will plan for office follow-up in 6 months for reassessment of symptoms, or sooner should symptoms worsen or fail to improve.

## 2022-06-07 ENCOUNTER — OFFICE VISIT (OUTPATIENT)
Dept: SPORTS MEDICINE | Facility: CLINIC | Age: 72
End: 2022-06-07

## 2022-06-07 VITALS
HEIGHT: 73 IN | HEART RATE: 86 BPM | DIASTOLIC BLOOD PRESSURE: 60 MMHG | RESPIRATION RATE: 16 BRPM | OXYGEN SATURATION: 96 % | BODY MASS INDEX: 33.93 KG/M2 | SYSTOLIC BLOOD PRESSURE: 128 MMHG | TEMPERATURE: 97.8 F | WEIGHT: 256 LBS

## 2022-06-07 DIAGNOSIS — S76.312A PARTIAL HAMSTRING TEAR, LEFT, INITIAL ENCOUNTER: Primary | ICD-10-CM

## 2022-06-07 PROCEDURE — 99203 OFFICE O/P NEW LOW 30 MIN: CPT | Performed by: FAMILY MEDICINE

## 2022-06-07 NOTE — PROGRESS NOTES
"Vicente is a 71 y.o. year old male     Chief Complaint   Patient presents with   • hamstring tear     Self referral eval for LT hamstring tear - injury was in 08/2021 s/p fall in his driveway, having an hyperextension injury - MRI on 05/18/2022 at [facility] after eval with [facility], xrays done at [facility] as well - here today for further evaluation and treatment        History of Present Illness  HPI   Here today for evaluation of left hamstring injury.  He describes losing his balance in August 2021 curly the left leg trying to stabilize himself.  This led to acute sharp pain in the left posterior hip radiating down the thigh.  He was initially treated with physical therapy in November and December 2021 without improvement.  Pain continued, worse with sitting on a hard surface, worse with bending over, less with walking.  He was subsequently seen by orthopedic surgery, had a steroid injection with limited relief.  He did eventually have some benefit from physical therapy later on but reached a plateau.  He did have some similar pain on the right side but that has since resolved.  He is here today requesting consideration of PRP treatment.      Review of Systems    /60 (BP Location: Right arm, Patient Position: Sitting, Cuff Size: Adult)   Pulse 86   Temp 97.8 °F (36.6 °C) (Temporal)   Resp 16   Ht 185.4 cm (73\")   Wt 116 kg (256 lb)   SpO2 96%   BMI 33.78 kg/m²      Physical Exam    Vital signs reviewed.   General: No acute distress.      MSK Exam:  Ortho Exam  Tenderness palpation left hamstring origin.  Preserved range of motion in left hip.    Reviewed the details of his orthopedic consultation and MRI report.  This shows interstitial tearing of the left hamstring origin approximately 50% cross-sectional area of the tendon complex predominantly on the deep surface.  There is 25% thickness of the right side.    Diagnoses and all orders for this visit:    Partial hamstring tear, left, initial encounter    Other " orders  -     Coenzyme Q10 10 MG capsule; Take  by mouth.  -     milk thistle 175 MG tablet; Take 175 mg by mouth Daily.      I had a long discussion with the patient regarding treatment options for his injury.  No guarantees were given but I do agree that PRP is the next appropriate treatment for this condition.  We discussed this in detail.  Questions were answered.  He will consider this and schedule if he chooses to proceed.

## 2022-06-10 ENCOUNTER — TELEPHONE (OUTPATIENT)
Dept: SPORTS MEDICINE | Facility: CLINIC | Age: 72
End: 2022-06-10

## 2022-06-10 ENCOUNTER — PATIENT ROUNDING (BHMG ONLY) (OUTPATIENT)
Dept: SPORTS MEDICINE | Facility: CLINIC | Age: 72
End: 2022-06-10

## 2022-06-10 NOTE — TELEPHONE ENCOUNTER
Caller: PATIENT    Relationship to patient: SELF     Best call back number: [phone]    Chief complaint: Partial hamstring tear, left,     Type of visit: PRP INJECTION    Requested date: ASAP     Additional notes: PATIENT WAS CALLING TO SCHEDULE A PRP INJECTION WITH DR. PATIÑO. THANK YOU!

## 2022-06-10 NOTE — TELEPHONE ENCOUNTER
Caller: TOMER SPANN    Relationship to patient: SELF    Best call back number: [phone]    Patient is needing: UNABLE TO WARM TRANSFER.  PATIENT CALLED ASKING ABOUT SCHEDULING.  OFFICE ADVISED NEED A BIT MORE TIME FOR DR TO REVIEW

## 2022-06-10 NOTE — PROGRESS NOTES
Iveth 10, 2022    A [employer] Message has been sent to the patient for PATIENT ROUNDING with [facility]

## 2022-06-30 ENCOUNTER — PROCEDURE VISIT (OUTPATIENT)
Dept: SPORTS MEDICINE | Facility: CLINIC | Age: 72
End: 2022-06-30

## 2022-06-30 VITALS
OXYGEN SATURATION: 97 % | WEIGHT: 256 LBS | HEART RATE: 92 BPM | BODY MASS INDEX: 33.93 KG/M2 | TEMPERATURE: 97.5 F | DIASTOLIC BLOOD PRESSURE: 70 MMHG | SYSTOLIC BLOOD PRESSURE: 130 MMHG | HEIGHT: 73 IN | RESPIRATION RATE: 16 BRPM

## 2022-06-30 DIAGNOSIS — S76.312A PARTIAL HAMSTRING TEAR, LEFT, INITIAL ENCOUNTER: Primary | ICD-10-CM

## 2022-06-30 PROCEDURE — PRPANGEL: Performed by: FAMILY MEDICINE

## 2022-06-30 RX ORDER — SILDENAFIL CITRATE 20 MG/1
TABLET ORAL
COMMUNITY
Start: 2022-06-21

## 2022-06-30 NOTE — PROGRESS NOTES
Here today for PRP injection to left partial hamstring tear.  Consent verified.    PRP collected and  using Arthrex Cristobal process.  Leukocyte rich approach.    The partial tear of the left hamstring origin was identified by ultrasound examination.  Injection site was prepped for sterile technique.  Initially approximately 3 mL of 0.5% plain lidocaine was administered to the subcutaneous layer down to the surface of the tendon for local anesthesia.  Subsequently the syringe was changed to inject a total of approxione 0.5 mL leukocyte rich PRP with a gentle fenestration of the tendon origin at the site of chronic damage.  This was tolerated well.    Reviewed postprocedure instructions.  Rest for 1 week.  Resume physical therapy after 1 week.  Expect increased pain for the first week and do not expect to see benefits until 4 to 6 weeks after the injection.  We will follow-up in about 6 weeks to check on his progress.    Diagnoses and all orders for this visit:    Partial hamstring tear, left, initial encounter    Other orders  -     sildenafil (REVATIO) 20 MG tablet; TAKE 2 TO 5 TABLETS BY MOUTH 30 MINUTES PRIOR TO SEXUAL ACTIVITY

## 2022-07-22 ENCOUNTER — OFFICE VISIT (OUTPATIENT)
Dept: GASTROENTEROLOGY | Facility: CLINIC | Age: 72
End: 2022-07-22

## 2022-07-22 VITALS
HEART RATE: 80 BPM | BODY MASS INDEX: 33.58 KG/M2 | OXYGEN SATURATION: 95 % | DIASTOLIC BLOOD PRESSURE: 70 MMHG | SYSTOLIC BLOOD PRESSURE: 140 MMHG | WEIGHT: 253.4 LBS | HEIGHT: 73 IN | TEMPERATURE: 97.9 F

## 2022-07-22 DIAGNOSIS — R10.9 LEFT SIDED ABDOMINAL PAIN: ICD-10-CM

## 2022-07-22 DIAGNOSIS — Z12.11 COLON CANCER SCREENING: ICD-10-CM

## 2022-07-22 DIAGNOSIS — K59.00 CONSTIPATION, UNSPECIFIED CONSTIPATION TYPE: Chronic | ICD-10-CM

## 2022-07-22 DIAGNOSIS — K76.0 FATTY LIVER: Primary | Chronic | ICD-10-CM

## 2022-07-22 DIAGNOSIS — Z86.010 HX OF ADENOMATOUS COLONIC POLYPS: ICD-10-CM

## 2022-07-22 PROCEDURE — 99214 OFFICE O/P EST MOD 30 MIN: CPT | Performed by: NURSE PRACTITIONER

## 2022-07-22 NOTE — PROGRESS NOTES
Chief Complaint   Patient presents with   • Follow-up     Constipation. Left sided abdominal pain, fatty liver         History of Present Illness  71-year-old male presents the office today for follow-up.  He was last seen in office on 2/8/2022.  He has a history of left-sided abdominal pain, fatty liver, constipation, internal hemorrhoids, diverticulosis, and colon polyps.  Due to the presence of adenomatous colon polyps, he is due for his next colonoscopy at a 5-year interval, 9/23/2025.    Last hepatic function panel was normal February 2022 for ongoing monitoring of fatty liver.  Patient has had abdominal pain.    He reports that since his last office visit his left nominal pain has improved.  He states he still cannot sleep on his left side.  He feels that the discomfort could be dependent on the types of food that he eats, but he cannot pinpoint any specific foods.    He does report some mild constipation.  He has regular bowel movements daily and occasionally has to strain.  Previous fiber supplementation and stool softener were recommended, but he has not tried the stool softener and has been quite sometime since he has tried fiber.  He denies any melena or hematochezia.    He denies any nausea, vomiting, or dysphagia.  Occasionally he will have some mild heartburn.    Review of Systems   Constitutional: Negative for fever and unexpected weight change.   HENT: Negative for trouble swallowing.    Cardiovascular: Negative for chest pain.   Gastrointestinal: Positive for abdominal pain and constipation. Negative for abdominal distention, anal bleeding, blood in stool, diarrhea, nausea, rectal pain and vomiting.         Result Review :       Office Visit with Hali Mckeon APRN (02/08/2022)  SCANNED - COLONOSCOPY (09/23/2020)  Tissue Pathology Exam (09/23/2020 08:42)  COMPREHENSIVE METABOLIC PANEL (02/22/2022 08:28)  CT Abdomen & Pelvis W IV Contrast Oral Contrast if Indicated by Radiology (01/19/2022  "17:00)    Vital Signs:   /70   Pulse 80   Temp 97.9 °F (36.6 °C)   Ht 185.4 cm (73\")   Wt 115 kg (253 lb 6.4 oz)   SpO2 95%   BMI 33.43 kg/m²     Body mass index is 33.43 kg/m².     Physical Exam  Vitals reviewed.   Constitutional:       Appearance: Normal appearance.   Pulmonary:      Effort: Pulmonary effort is normal. No respiratory distress.   Abdominal:      General: Abdomen is flat. Bowel sounds are normal. There is no distension.      Palpations: Abdomen is soft. There is no mass.      Tenderness: There is no abdominal tenderness. There is no guarding.   Musculoskeletal:         General: Normal range of motion.   Skin:     General: Skin is warm and dry.   Neurological:      General: No focal deficit present.      Mental Status: He is alert and oriented to person, place, and time.   Psychiatric:         Mood and Affect: Mood normal.         Behavior: Behavior normal.         Thought Content: Thought content normal.         Judgment: Judgment normal.       Assessment and Plan    Diagnoses and all orders for this visit:    1. Fatty liver (Primary)  -     Hepatic Function Panel    2. Constipation, unspecified constipation type    3. Hx of adenomatous colonic polyps    4. Colon cancer screening    5. Left sided abdominal pain             Patient Instructions   1. Fiber supplement such as Citrucel, Metamucil, or Benefiber.  Generic from [employer] is also fine.  This can be purchased over-the-counter chewable version pharmacy recommend you start off with 1 serving per day with a full 8 ounce glass of water.    2.  Due to history of colon polyps, you are currently recovering surveillance colonoscopy 9/23/2025.    3.  For fatty liver, the recommended treatment is weight loss along with regular exercise, avoidance of foods containing high fructose corn syrup, and alcohol avoidance.  We will check hepatic function panel today for ongoing monitoring.  Continue milk thistle daily.       Discussion:    We will " check a hepatic function panel today for ongoing monitoring of fatty liver.  Lifestyle modifications discussed.  Patient to continue milk thistle daily.    Next colonoscopy due 2025 and patient in recall accordingly due to his history of colon polyps.    Ultimately I feel that his left-sided abdominal discomfort is due to splenic flexure syndrome and feel that getting him on a daily fiber supplement to help better manage his bowel habits and minimize constipation will improve symptoms.  Could consider repeat CT scan of the abdomen pelvis or repeat colonoscopy depending upon how his symptoms play out.  Patient verbalized understand above plan of care and is in agreement.  All questions answered and support provided.     EMR Dragon/Transcription Disclaimer:  This document has been Dictated utilizing Dragon dictation.

## 2022-07-22 NOTE — PATIENT INSTRUCTIONS
Fiber supplement such as Citrucel, Metamucil, or Benefiber.  Generic from [employer] is also fine.  This can be purchased over-the-counter chewable version pharmacy recommend you start off with 1 serving per day with a full 8 ounce glass of water.    2.  Due to history of colon polyps, you are currently recovering surveillance colonoscopy 9/23/2025.    3.  For fatty liver, the recommended treatment is weight loss along with regular exercise, avoidance of foods containing high fructose corn syrup, and alcohol avoidance.  We will check hepatic function panel today for ongoing monitoring.  Continue milk thistle daily.

## 2022-07-23 LAB
ALBUMIN SERPL-MCNC: 4.8 G/DL (ref 3.8–4.8)
ALP SERPL-CCNC: 84 IU/L (ref 44–121)
ALT SERPL-CCNC: 46 IU/L (ref 0–44)
AST SERPL-CCNC: 31 IU/L (ref 0–40)
BILIRUB DIRECT SERPL-MCNC: 0.18 MG/DL (ref 0–0.4)
BILIRUB SERPL-MCNC: 0.6 MG/DL (ref 0–1.2)
PROT SERPL-MCNC: 7.1 G/DL (ref 6–8.5)

## 2022-07-26 DIAGNOSIS — K76.0 FATTY LIVER: Primary | ICD-10-CM

## 2022-08-09 ENCOUNTER — OFFICE VISIT (OUTPATIENT)
Dept: SPORTS MEDICINE | Facility: CLINIC | Age: 72
End: 2022-08-09

## 2022-08-09 VITALS
HEIGHT: 73 IN | BODY MASS INDEX: 33.4 KG/M2 | WEIGHT: 252 LBS | SYSTOLIC BLOOD PRESSURE: 132 MMHG | DIASTOLIC BLOOD PRESSURE: 40 MMHG | HEART RATE: 85 BPM | TEMPERATURE: 96.5 F | OXYGEN SATURATION: 97 %

## 2022-08-09 DIAGNOSIS — S76.312D PARTIAL HAMSTRING TEAR, LEFT, SUBSEQUENT ENCOUNTER: Primary | ICD-10-CM

## 2022-08-09 PROCEDURE — 99213 OFFICE O/P EST LOW 20 MIN: CPT | Performed by: FAMILY MEDICINE

## 2022-08-09 NOTE — PROGRESS NOTES
"iVcente is a 71 y.o. year old male     Chief Complaint   Patient presents with   • Leg Pain     LEFT HAMSTRING- 6 week follow up       History of Present Illness  HPI   Here to follow-up on left partial hamstring tear status post PRP injection.  He notes he is able to sit more easily, but still has pain bending the knee and hip.  He feels like his pain is improved in the lateral aspect of the ischial tuberosity but not to the medial aspect.    Review of Systems    /40 (BP Location: Left arm, Patient Position: Sitting, Cuff Size: Large Adult)   Pulse 85   Temp 96.5 °F (35.8 °C) (Temporal)   Ht 185.4 cm (72.99\")   Wt 114 kg (252 lb)   SpO2 97%   BMI 33.25 kg/m²      Physical Exam    Vital signs reviewed.   General: No acute distress.      MSK Exam:  Ortho Exam  Left hip tenderness palpation still on the ischial tuberosity, notably more to the medial aspect of the hamstring origin compared to the lateral.  Strength remains intact with hip extension and knee flexion.      Diagnoses and all orders for this visit:    Partial hamstring tear, left, subsequent encounter  -     Ambulatory Referral to Physical Therapy Evaluate and treat      Discussed considerations.  We discussed that he may still see effects of PRP for another month so we are going to try to optimize his response with physical therapy.  If at the 3-month postinjection point he is still dissatisfied we could reasonably consider repeat PRP injection.  He is on to consider this and will follow-up with me based on his progress.  "

## 2022-09-20 ENCOUNTER — OFFICE VISIT (OUTPATIENT)
Dept: SPORTS MEDICINE | Facility: CLINIC | Age: 72
End: 2022-09-20

## 2022-09-20 VITALS
TEMPERATURE: 97.5 F | OXYGEN SATURATION: 97 % | HEART RATE: 86 BPM | BODY MASS INDEX: 33.93 KG/M2 | WEIGHT: 256 LBS | SYSTOLIC BLOOD PRESSURE: 138 MMHG | HEIGHT: 73 IN | DIASTOLIC BLOOD PRESSURE: 60 MMHG

## 2022-09-20 DIAGNOSIS — S76.312D PARTIAL HAMSTRING TEAR, LEFT, SUBSEQUENT ENCOUNTER: Primary | ICD-10-CM

## 2022-09-20 PROCEDURE — 99213 OFFICE O/P EST LOW 20 MIN: CPT | Performed by: FAMILY MEDICINE

## 2022-09-20 NOTE — PROGRESS NOTES
"Vicente is a 71 y.o. year old male     Chief Complaint   Patient presents with   • Hip Pain     LEFT SIDE- follow up on hamstring pain, states that it has not gotten better.       History of Present Illness  HPI   Follow up ongoing left hamstring origin partial tear.  No clear changes since last visit.      Review of Systems    /60 (BP Location: Right arm, Patient Position: Sitting, Cuff Size: Adult)   Pulse 86   Temp 97.5 °F (36.4 °C) (Temporal)   Ht 185.4 cm (72.99\")   Wt 116 kg (256 lb)   SpO2 97%   BMI 33.78 kg/m²      Physical Exam    Vital signs reviewed.   General: No acute distress.      MSK Exam:  Ortho Exam  Antalgic gait noted      Diagnoses and all orders for this visit:    Partial hamstring tear, left, subsequent encounter    We had a long discussion about long-term progression here.  He clearly had some improvement from PRP injection, but the cost prohibits him from repeating it unfortunately.  We discussed continued exercise strategies, optimizing functional strength, etc.  He can follow-up with me at anytime as needed.  We did discuss potential repeat steroid injection, but there are certain some downsides that we discussed as well.  "

## 2023-01-26 ENCOUNTER — OFFICE VISIT (OUTPATIENT)
Dept: GASTROENTEROLOGY | Facility: CLINIC | Age: 73
End: 2023-01-26
Payer: MEDICARE

## 2023-01-26 ENCOUNTER — LAB (OUTPATIENT)
Dept: LAB | Facility: HOSPITAL | Age: 73
End: 2023-01-26
Payer: MEDICARE

## 2023-01-26 VITALS
TEMPERATURE: 96.9 F | HEIGHT: 73 IN | BODY MASS INDEX: 34.87 KG/M2 | HEART RATE: 92 BPM | WEIGHT: 263.1 LBS | SYSTOLIC BLOOD PRESSURE: 128 MMHG | OXYGEN SATURATION: 97 % | DIASTOLIC BLOOD PRESSURE: 52 MMHG

## 2023-01-26 DIAGNOSIS — Z86.010 HX OF COLONIC POLYPS: ICD-10-CM

## 2023-01-26 DIAGNOSIS — K76.0 FATTY LIVER: Primary | ICD-10-CM

## 2023-01-26 DIAGNOSIS — Z12.11 COLON CANCER SCREENING: ICD-10-CM

## 2023-01-26 DIAGNOSIS — K76.0 FATTY LIVER: Chronic | ICD-10-CM

## 2023-01-26 DIAGNOSIS — K59.00 CONSTIPATION, UNSPECIFIED CONSTIPATION TYPE: Primary | Chronic | ICD-10-CM

## 2023-01-26 PROCEDURE — 99214 OFFICE O/P EST MOD 30 MIN: CPT | Performed by: NURSE PRACTITIONER

## 2023-01-26 PROCEDURE — 80076 HEPATIC FUNCTION PANEL: CPT | Performed by: NURSE PRACTITIONER

## 2023-01-26 RX ORDER — ASPIRIN 81 MG/1
81 TABLET ORAL DAILY
COMMUNITY
Start: 2022-11-23

## 2023-01-26 RX ORDER — EZETIMIBE 10 MG/1
1 TABLET ORAL DAILY
COMMUNITY
Start: 2022-12-19

## 2023-01-26 NOTE — PROGRESS NOTES
Chief Complaint   Patient presents with   • Follow-up     Constipation, colon polyps, fatty liver         History of Present Illness  72-year-old male presents the office today for follow-up.  He was last seen in office on 7/22/2022.  He has a history of fatty liver, constipation, internal hemorrhoids, diverticulosis, and colon polyps.  Last colonoscopy was performed on 9/23/2020 and he is due for surveillance at a 5-year interval, 9/23/2025.    He has a history of fatty liver.  He continues milk thistle supplementation.  Last CMP on 2/22/2022 revealed normal liver enzymes.    He has a history of mild constipation and was recommended to start a daily fiber supplement at his last office visit. BMs are still rock hard. He tried fiber but had some side effects and discontinued use. He reports having a BM 1-2 times per day. He has never tried MiraLax. He reports recent TSH level was elevated at 4.940. He reports plans to start thyroid replacement through his endocrinologist. He denies any rectal bleeding. He continues to report pain in LUQ than comes and goes. Pain is noticed when he lays down in the bed. Pain can be dull or sharp and varies. Pain occurs almost every night.      He reports recent TSH level was elevated at 4.940. He reports plans to start thyroid replacement through his endocrinologist.     He denies any upper GI symptoms.     Review of Systems   Constitutional: Positive for unexpected weight change. Negative for fever.   HENT: Negative for trouble swallowing.    Cardiovascular: Negative for chest pain.   Gastrointestinal: Positive for abdominal pain and constipation. Negative for abdominal distention, anal bleeding, blood in stool, diarrhea, nausea, rectal pain and vomiting.      Result Review :       Office Visit with Hali Mckeon APRN (07/22/2022)  SCANNED - COLONOSCOPY (09/23/2020)  Tissue Pathology Exam (09/23/2020 08:42)  Vital Signs:   /52   Pulse 92   Temp 96.9 °F (36.1 °C)   Ht  "185.4 cm (73\")   Wt 119 kg (263 lb 1.6 oz)   SpO2 97%   BMI 34.71 kg/m²     Body mass index is 34.71 kg/m².     Physical Exam  Vitals reviewed.   Constitutional:       Appearance: Normal appearance.   Pulmonary:      Effort: Pulmonary effort is normal. No respiratory distress.   Abdominal:      General: Abdomen is flat. Bowel sounds are normal. There is no distension.      Palpations: Abdomen is soft. There is no mass.      Tenderness: There is no abdominal tenderness. There is no guarding.   Musculoskeletal:         General: Normal range of motion.   Skin:     General: Skin is warm and dry.   Neurological:      General: No focal deficit present.      Mental Status: He is alert and oriented to person, place, and time.   Psychiatric:         Mood and Affect: Mood normal.         Behavior: Behavior normal.         Thought Content: Thought content normal.         Judgment: Judgment normal.       Assessment and Plan    Diagnoses and all orders for this visit:    1. Constipation, unspecified constipation type (Primary)    2. Fatty liver    3. Hx of colonic polyps    4. Colon cancer screening           Patient Instructions   1. For constipation, we recommend you start MiraLax. Generic version is also fine and is available OTC at your local grocery or pharmacy. We recommend that you start with 1 capful daily and you may adjust this dosing based upon how your bowel habits respond.    2.   For ongoing monitoring of fatty liver, we will check your liver enzymes today. We will contact you with results once available. For fatty liver, the recommended treatment is weight loss along with regular exercise, avoidance of foods containing high fructose corn syrup, and alcohol avoidance.  We also recommend daily supplementation with milk thistle.  This is an antioxidant that supports liver health and is available over-the-counter at your local grocery or pharmacy.    3.  Due to your history of colon polyps, your next colonoscopy " will be due 9/23/2025 and you are in recall accordingly.    4.   Plan for next OV in 1 yr for reassessment of symptoms or sooner should symptoms worsen or fail to improve.        Discussion:    We have recommended use of MiraLAX for management of constipation, having the patient's start with 1 capful per day and titrate accordingly to help produce regular bowel movements.  Additionally, he was noted to have hypothyroidism and there are plans to start him on thyroid replacement medication soon through his endocrinology office.  His bowel habits may regulate along with use of thyroid medication.  I feel that the discomfort that he has having in his left upper middle quadrant is secondary to splenic flexure syndrome.  Patient was instructed to contact the office if his abdominal discomfort persists despite management of constipation and production of regular bowel movements.  We would then order CT of the abdomen and pelvis for further evaluation.    For fatty liver, we will check a hepatic function panel today for ongoing monitoring.  Recommend continuation of milk thistle and lifestyle modifications.    Next colonoscopy due 9/23/2025 due to his history of colon polyps.  Recommend annual office follow-up unless symptoms fail to resolve.  Patient verbalized understanding of above plan of care and is in agreement.  All questions answered and support provided.     EMR Dragon/Transcription Disclaimer:  This document has been Dictated utilizing Dragon dictation.

## 2023-01-26 NOTE — PATIENT INSTRUCTIONS
For constipation, we recommend you start MiraLax. Generic version is also fine and is available OTC at your local grocery or pharmacy. We recommend that you start with 1 capful daily and you may adjust this dosing based upon how your bowel habits respond.    2.   For ongoing monitoring of fatty liver, we will check your liver enzymes today. We will contact you with results once available. For fatty liver, the recommended treatment is weight loss along with regular exercise, avoidance of foods containing high fructose corn syrup, and alcohol avoidance.  We also recommend daily supplementation with milk thistle.  This is an antioxidant that supports liver health and is available over-the-counter at your local grocery or pharmacy.    3.  Due to your history of colon polyps, your next colonoscopy will be due 9/23/2025 and you are in recall accordingly.    4.   Plan for next OV in 1 yr for reassessment of symptoms or sooner should symptoms worsen or fail to improve.

## 2023-09-05 ENCOUNTER — OFFICE VISIT (OUTPATIENT)
Dept: SPORTS MEDICINE | Facility: CLINIC | Age: 73
End: 2023-09-05
Payer: MEDICARE

## 2023-09-05 VITALS
OXYGEN SATURATION: 97 % | RESPIRATION RATE: 16 BRPM | HEART RATE: 86 BPM | BODY MASS INDEX: 33.66 KG/M2 | DIASTOLIC BLOOD PRESSURE: 60 MMHG | HEIGHT: 73 IN | WEIGHT: 254 LBS | SYSTOLIC BLOOD PRESSURE: 138 MMHG

## 2023-09-05 DIAGNOSIS — M70.51 PES ANSERINUS BURSITIS OF RIGHT KNEE: ICD-10-CM

## 2023-09-05 DIAGNOSIS — M25.561 ACUTE PAIN OF RIGHT KNEE: Primary | ICD-10-CM

## 2023-09-05 PROCEDURE — 99214 OFFICE O/P EST MOD 30 MIN: CPT | Performed by: FAMILY MEDICINE

## 2023-09-05 PROCEDURE — 3075F SYST BP GE 130 - 139MM HG: CPT | Performed by: FAMILY MEDICINE

## 2023-09-05 PROCEDURE — 3078F DIAST BP <80 MM HG: CPT | Performed by: FAMILY MEDICINE

## 2023-09-05 RX ORDER — IMIQUIMOD 12.5 MG/.25G
1 CREAM TOPICAL NIGHTLY
COMMUNITY
Start: 2023-09-04

## 2023-09-05 RX ORDER — MELOXICAM 15 MG/1
15 TABLET ORAL DAILY
Qty: 30 TABLET | Refills: 2 | Status: SHIPPED | OUTPATIENT
Start: 2023-09-05

## 2023-09-05 RX ORDER — DAPAGLIFLOZIN 5 MG/1
5 TABLET, FILM COATED ORAL DAILY
COMMUNITY
Start: 2023-01-28

## 2023-09-05 NOTE — PROGRESS NOTES
"Vicente is a 72 y.o. year old male     Chief Complaint   Patient presents with    Right Knee - Pain, Initial Evaluation     New eval for RT knee pain for about 2 weeks, NKI - ibuprofen helps with pain, but no other sxs reported - here for further evaluation and treatment        History of Present Illness  HPI   Answers submitted by the patient for this visit:  Primary Reason for Visit (Submitted on 8/29/2023)  What is the primary reason for your visit?: Other  Other (Submitted on 8/29/2023)  Please describe your symptoms.: Chronic knee pain  Have you had these symptoms before?: No  How long have you been having these symptoms?: 1-2 weeks  Please list any medications you are currently taking for this condition.: Ibuprofen aspirin  Please describe any probable cause for these symptoms. : Unknown    Here today for right knee pain.  Started about 3 weeks ago without injury after sitting for several hours while he was listening to his grandson's baseball game.  Pain in the medial knee, associated with some swelling and warmth, temporarily relieved with NSAIDs and ice.    As an aside, his previous right hamstring injury eventually improved substantially after PRP injection.    Review of Systems    /60 (BP Location: Left arm, Patient Position: Sitting, Cuff Size: Large Adult)   Pulse 86   Resp 16   Ht 185.4 cm (72.99\")   Wt 115 kg (254 lb)   SpO2 97%   BMI 33.52 kg/m²      Physical Exam    Vital signs reviewed.   General: No acute distress.      MSK Exam:  Ortho Exam  Right knee: Normal appearance except for small amount of soft tissue swelling over the medial aspect of the joint into the pes anserine bursa.  There is no effusion.  There is tenderness palpation medial joint line and the pes anserine bursa.  Normal range of motion.  There is pain with active knee extension but intact strength.  There is no ligamentous laxity noted.  Negative meniscal testing.    Right Knee X-Ray  Indication: Pain    Views: AP, " Lateral, and [place]    Findings:  No fracture  No bony lesion  Normal soft tissues  Normal joint spaces    No prior studies were available for comparison.       Diagnoses and all orders for this visit:    Acute pain of right knee  -     XR Knee 3+ View With [place] Right    Pes anserinus bursitis of right knee  -     meloxicam (Mobic) 15 MG tablet; Take 1 tablet by mouth Daily.    Other orders  -     dapagliflozin (Farxiga) 5 MG tablet tablet; Take 1 tablet by mouth Daily.  -     imiquimod (ALDARA) 5 % cream; Apply 1 application  topically to the appropriate area as directed Every Night.    This appears primarily to be pes anserine bursitis, possibly worse with his mild arthritis.  We will start some meloxicam, continue ice, and compression sleeve, and start some basic home exercises.  If not improving we can plan an injection.      EMR Dragon/Transcription disclaimer:    Much of this encounter note is an electronic transcription/translation of spoken language to printed text.  The electronic translation of spoken language may permit erroneous, or at times, nonsensical words or phrases to be inadvertently transcribed.  Although I have reviewed the note for such errors some may still exist.

## 2024-01-15 ENCOUNTER — LAB (OUTPATIENT)
Dept: LAB | Facility: HOSPITAL | Age: 74
End: 2024-01-15
Payer: MEDICARE

## 2024-01-15 PROCEDURE — 80076 HEPATIC FUNCTION PANEL: CPT | Performed by: NURSE PRACTITIONER

## 2024-01-30 ENCOUNTER — OFFICE VISIT (OUTPATIENT)
Dept: GASTROENTEROLOGY | Facility: CLINIC | Age: 74
End: 2024-01-30
Payer: MEDICARE

## 2024-01-30 VITALS
DIASTOLIC BLOOD PRESSURE: 62 MMHG | BODY MASS INDEX: 33.85 KG/M2 | SYSTOLIC BLOOD PRESSURE: 136 MMHG | OXYGEN SATURATION: 97 % | WEIGHT: 255.4 LBS | HEIGHT: 73 IN | HEART RATE: 83 BPM | TEMPERATURE: 98.9 F

## 2024-01-30 DIAGNOSIS — K76.0 FATTY LIVER: Chronic | ICD-10-CM

## 2024-01-30 DIAGNOSIS — K59.00 CONSTIPATION, UNSPECIFIED CONSTIPATION TYPE: Primary | Chronic | ICD-10-CM

## 2024-01-30 DIAGNOSIS — D12.6 ADENOMATOUS POLYP OF COLON, UNSPECIFIED PART OF COLON: ICD-10-CM

## 2024-01-30 DIAGNOSIS — Z12.11 COLON CANCER SCREENING: ICD-10-CM

## 2024-01-30 RX ORDER — TAMSULOSIN HYDROCHLORIDE 0.4 MG/1
0.4 CAPSULE ORAL DAILY
COMMUNITY
Start: 2023-12-06 | End: 2024-12-05

## 2024-01-30 NOTE — PATIENT INSTRUCTIONS
1. For fatty liver, the recommended treatment is weight loss along with regular exercise, avoidance of foods containing high fructose corn syrup, and alcohol avoidance.  We also recommend daily supplementation with milk thistle.  This is an antioxidant that supports liver health and is available over-the-counter at your local grocery or pharmacy.    2.  Next colonoscopy due September 2025 due to your history of colon polyps and you are in recall accordingly.    3.  For constipation, we recommend that you start MiraLax or its generic equivalent 1 capful up to twice daily. You may adjust your dosing based upon how your bowel habits respond.     4.  Recommend follow up in office in 1 yr or sooner should symptoms worsen or fail to improve.

## 2024-01-30 NOTE — PROGRESS NOTES
"Chief Complaint   Patient presents with    Follow-up     Constipation, fatty liver, colon cancer screening         History of Present Illness  73-year-old male presents the office today for follow-up.  He was last seen in office on 1/26/2023.  Is a history of fatty liver, constipation, diverticulosis, colon polyps, and internal hemorrhoids.  Last colonoscopy was performed on 9/23/2020 and neck surveillance colonoscopy will be due on 9/23/2025.    He continues daily supplementation with milk thistle.  He has a history of fatty liver.  Last hepatic function panel performed on 1/15/2024 revealed normal liver enzymes. He reports that he has gained and lost about 4 lbs.     Has a history of constipation.  He previously tried fiber to the mentation, but this was ineffective.  At his last office visit he was noted to have an elevated TSH level at 4.940.  He reports that his last Bm was last Thursday or Friday. We had recommended that he start MiraLAX at his last office visit but he has not tried this. He reports that he has to sit on the toilet for a while in order to have a BM. His stool is generally hard. He denies any blood per rectum. Stool is in the form of balls. He reports adequate hydration. He reports drinking sparkling water often.      Result Review :       Office Visit with Hali Mckeon APRN (01/26/2023)   SCANNED - COLONOSCOPY (09/23/2020)   Tissue Pathology Exam (09/23/2020 08:42)   TSH (07/11/2023 09:35)   Vital Signs:   /62   Pulse 83   Temp 98.9 °F (37.2 °C)   Ht 185.4 cm (73\")   Wt 116 kg (255 lb 6.4 oz)   SpO2 97%   BMI 33.70 kg/m²     Body mass index is 33.7 kg/m².     Physical Exam  Vitals reviewed.   Constitutional:       Appearance: Normal appearance.   Pulmonary:      Effort: Pulmonary effort is normal. No respiratory distress.   Abdominal:      General: Abdomen is flat. Bowel sounds are normal. There is no distension.      Palpations: Abdomen is soft. There is no mass.      " Tenderness: There is no abdominal tenderness. There is no guarding.   Musculoskeletal:         General: Normal range of motion.   Skin:     General: Skin is warm and dry.   Neurological:      General: No focal deficit present.      Mental Status: He is alert and oriented to person, place, and time.   Psychiatric:         Mood and Affect: Mood normal.         Behavior: Behavior normal.         Thought Content: Thought content normal.         Judgment: Judgment normal.       Assessment and Plan    Diagnoses and all orders for this visit:    1. Constipation, unspecified constipation type (Primary)    2. Fatty liver    3. Adenomatous polyp of colon, unspecified part of colon    4. Colon cancer screening           Patient Instructions   1. For fatty liver, the recommended treatment is weight loss along with regular exercise, avoidance of foods containing high fructose corn syrup, and alcohol avoidance.  We also recommend daily supplementation with milk thistle.  This is an antioxidant that supports liver health and is available over-the-counter at your local grocery or pharmacy.    2.  Next colonoscopy due September 2025 due to your history of colon polyps and you are in recall accordingly.    3.  For constipation, we recommend that you start MiraLax or its generic equivalent 1 capful up to twice daily. You may adjust your dosing based upon how your bowel habits respond.     4.  Recommend follow up in office in 1 yr or sooner should symptoms worsen or fail to improve.      Discussion:   Lifestyle modifications reviewed with the patient for management of fatty liver as well as continuation of milk thistle supplement.  Hepatic function panel within normal limits.  Weight loss was encouraged along with regular exercise.    Next colonoscopy due September 2025 due to patient's history of colon polyps.    Patient still remains constipated, but has not implemented any interventions for management.  MiraLAX was recommended for  him to start 1 capful daily and he may increase his dosing based upon how his bowel habits respond.  Recommend annual office follow-up for reassessment of symptoms or should symptoms worsen or fail to improve.  Patient states that it is a new year and he has not yet met his deductible with Medicare.  Medication can be cost prohibitive for him.  Could send consider use of lactulose or senna in the future.  Patient verbalized understanding of above plan of care and is in agreement.  All questions answered and support provided.    EMR Dragon/Transcription Disclaimer:  This document has been Dictated utilizing Dragon dictation.

## 2024-03-03 DIAGNOSIS — M70.51 PES ANSERINUS BURSITIS OF RIGHT KNEE: ICD-10-CM

## 2024-03-04 RX ORDER — MELOXICAM 15 MG/1
15 TABLET ORAL DAILY
Qty: 30 TABLET | Refills: 0 | Status: SHIPPED | OUTPATIENT
Start: 2024-03-04

## 2024-03-30 DIAGNOSIS — M70.51 PES ANSERINUS BURSITIS OF RIGHT KNEE: ICD-10-CM

## 2024-04-01 RX ORDER — MELOXICAM 15 MG/1
15 TABLET ORAL DAILY
Qty: 30 TABLET | Refills: 0 | Status: SHIPPED | OUTPATIENT
Start: 2024-04-01

## 2024-04-29 DIAGNOSIS — M70.51 PES ANSERINUS BURSITIS OF RIGHT KNEE: ICD-10-CM

## 2024-04-29 RX ORDER — MELOXICAM 15 MG/1
15 TABLET ORAL DAILY
Qty: 30 TABLET | Refills: 0 | Status: SHIPPED | OUTPATIENT
Start: 2024-04-29

## 2024-06-17 ENCOUNTER — TELEPHONE (OUTPATIENT)
Dept: GASTROENTEROLOGY | Facility: CLINIC | Age: 74
End: 2024-06-17
Payer: MEDICARE

## 2024-06-17 NOTE — TELEPHONE ENCOUNTER
"Hub staff attempted to follow warm transfer process and was unsuccessful     Caller: Vicente Cook \"Al\"    Relationship to patient: Self    Best call back number: [phone]    Patient is needing: PATIENT CALLED IN AND STATED THAT HE WOULD LIKE TO SCHEDULE COLONOSCOPY. PLEASE CALL BACK ANYTIME BEFORE NOON OR AFTER 2P, OKAY TO LEAVE .        "

## 2025-01-28 ENCOUNTER — PREP FOR SURGERY (OUTPATIENT)
Dept: SURGERY | Facility: SURGERY CENTER | Age: 75
End: 2025-01-28
Payer: MEDICARE

## 2025-01-28 ENCOUNTER — OFFICE VISIT (OUTPATIENT)
Dept: GASTROENTEROLOGY | Facility: CLINIC | Age: 75
End: 2025-01-28
Payer: MEDICARE

## 2025-01-28 VITALS
SYSTOLIC BLOOD PRESSURE: 142 MMHG | TEMPERATURE: 97.3 F | BODY MASS INDEX: 34.05 KG/M2 | DIASTOLIC BLOOD PRESSURE: 62 MMHG | HEIGHT: 73 IN | WEIGHT: 256.9 LBS | OXYGEN SATURATION: 96 % | HEART RATE: 95 BPM

## 2025-01-28 DIAGNOSIS — Z86.0100 HISTORY OF COLON POLYPS: ICD-10-CM

## 2025-01-28 DIAGNOSIS — K76.0 FATTY LIVER: Primary | ICD-10-CM

## 2025-01-28 DIAGNOSIS — Z86.0100 HISTORY OF COLON POLYPS: Primary | ICD-10-CM

## 2025-01-28 DIAGNOSIS — Z12.11 ENCOUNTER FOR SCREENING FOR MALIGNANT NEOPLASM OF COLON: ICD-10-CM

## 2025-01-28 DIAGNOSIS — K59.00 CONSTIPATION, UNSPECIFIED CONSTIPATION TYPE: ICD-10-CM

## 2025-01-28 PROCEDURE — 99214 OFFICE O/P EST MOD 30 MIN: CPT | Performed by: NURSE PRACTITIONER

## 2025-01-28 PROCEDURE — 3077F SYST BP >= 140 MM HG: CPT | Performed by: NURSE PRACTITIONER

## 2025-01-28 PROCEDURE — 1160F RVW MEDS BY RX/DR IN RCRD: CPT | Performed by: NURSE PRACTITIONER

## 2025-01-28 PROCEDURE — 1159F MED LIST DOCD IN RCRD: CPT | Performed by: NURSE PRACTITIONER

## 2025-01-28 PROCEDURE — 3078F DIAST BP <80 MM HG: CPT | Performed by: NURSE PRACTITIONER

## 2025-01-28 RX ORDER — SODIUM CHLORIDE, SODIUM LACTATE, POTASSIUM CHLORIDE, CALCIUM CHLORIDE 600; 310; 30; 20 MG/100ML; MG/100ML; MG/100ML; MG/100ML
30 INJECTION, SOLUTION INTRAVENOUS CONTINUOUS PRN
OUTPATIENT
Start: 2025-01-28 | End: 2025-01-28

## 2025-01-28 RX ORDER — ASPIRIN 325 MG
325 TABLET ORAL DAILY
COMMUNITY

## 2025-01-28 RX ORDER — SODIUM CHLORIDE 0.9 % (FLUSH) 0.9 %
10 SYRINGE (ML) INJECTION AS NEEDED
OUTPATIENT
Start: 2025-01-28

## 2025-01-28 RX ORDER — SODIUM CHLORIDE 0.9 % (FLUSH) 0.9 %
3 SYRINGE (ML) INJECTION EVERY 12 HOURS SCHEDULED
OUTPATIENT
Start: 2025-01-28

## 2025-01-28 NOTE — PROGRESS NOTES
"Chief Complaint   Patient presents with    GI Problem         History of Present Illness  Patient is a 74-year-old male who presents today for Follow-up.  He has a history of fatty liver, constipation, diverticulosis, colon polyps, and internal hemorrhoids.  He will be due for colonoscopy September 2025.    Patient presents today for follow-up.  Reports overall symptoms are stable and controlled since last office visit.  For constipation, he has been focusing on drinking more water.  At times he is having a daily bowel movement, at other times he is skipping a few days without having a bowel movement.  He has been taking stool softeners as needed.  He denies any blood in the stool.    He reports some intermittent left upper quadrant pain that comes and goes.  This has been present chronically and is stable.  Previously this was felt to be due to distention of splenic flexure.    He denies any nausea or vomiting.  Denies any heartburn or reflux.    Regarding fatty liver, he has been taking milk thistle every other day.  He had a CMP in November that showed normal liver function test.     Result Review :       COMPREHENSIVE METABOLIC PANEL (11/19/2024 09:46)    CT ABDOMEN PELVIS W CONTRAST (01/19/2022 17:00)    Tissue Pathology Exam (09/23/2020 08:42)    SCANNED - COLONOSCOPY (09/23/2020)    Office Visit with Hali Mckeon APRN (01/30/2024)    Vital Signs:   /62   Pulse 95   Temp 97.3 °F (36.3 °C)   Ht 185.4 cm (73\")   Wt 117 kg (256 lb 14.4 oz)   SpO2 96%   BMI 33.89 kg/m²     Body mass index is 33.89 kg/m².     Physical Exam  Vitals reviewed.   Constitutional:       General: He is not in acute distress.     Appearance: He is well-developed.   HENT:      Head: Normocephalic and atraumatic.   Pulmonary:      Effort: Pulmonary effort is normal. No respiratory distress.   Abdominal:      General: Abdomen is flat. Bowel sounds are normal. There is no distension.      Palpations: Abdomen is soft.      " Tenderness: There is no abdominal tenderness.   Skin:     General: Skin is dry.      Coloration: Skin is not pale.   Neurological:      Mental Status: He is alert and oriented to person, place, and time.   Psychiatric:         Thought Content: Thought content normal.           Assessment and Plan    Diagnoses and all orders for this visit:    1. Fatty liver (Primary)    2. Constipation, unspecified constipation type    3. History of colon polyps         Discussion  Patient presents today for follow-up.  Regarding fatty liver, transaminases have been normal.  We discussed consideration for FibroSure and FibroScan to evaluate for any underlying fibrosis at today's office visit.  Patient would prefer to hold off on this testing at this time but may consider in the future.  Reinforced dietary and lifestyle modifications to help with fatty liver and recommended continuing milk thistle.    Constipation currently controlled with increasing water intake and stool softeners.  Will continue current management.    Due to history of colon polyps, he will be due for surveillance colonoscopy September 2025.  Will arrange for this to be completed today.    As long as symptoms are stable and controlled, we will plan on follow-up yearly and he was instructed to call for any new or worsening symptoms.          Follow Up   Return in about 1 year (around 1/28/2026), or if symptoms worsen or fail to improve.    Patient Instructions   For constipation, okay to continue using stool softeners as needed.  Drink plenty of water and follow a high-fiber diet.    For fatty liver, weight loss is recommended. Recommend following a low fat and low sugar diet. Recommend management of diabetes and elevated cholesterol with primary care provider if indicated. Regular exercise is recommended. Alcohol avoidance is recommended.  For fatty liver, continue taking milk thistle.    Schedule colonoscopy for surveillance of colon polyps to be done September  2025.

## 2025-01-28 NOTE — PATIENT INSTRUCTIONS
For constipation, okay to continue using stool softeners as needed.  Drink plenty of water and follow a high-fiber diet.    For fatty liver, weight loss is recommended. Recommend following a low fat and low sugar diet. Recommend management of diabetes and elevated cholesterol with primary care provider if indicated. Regular exercise is recommended. Alcohol avoidance is recommended.  For fatty liver, continue taking milk thistle.    Schedule colonoscopy for surveillance of colon polyps to be done September 2025.

## (undated) DEVICE — ANTIBACTERIAL UNDYED BRAIDED (POLYGLACTIN 910), SYNTHETIC ABSORBABLE SUTURE: Brand: COATED VICRYL

## (undated) DEVICE — DRSNG WND GEL FIBR OPTICELL AG PLS W/SLV LF 4X5IN  STRL

## (undated) DEVICE — SKIN PREP TRAY W/CHG: Brand: MEDLINE INDUSTRIES, INC.

## (undated) DEVICE — SHEET, DRAPE, SPLIT, STERILE: Brand: MEDLINE

## (undated) DEVICE — ENCORE® LATEX ORTHO SIZE 8, STERILE LATEX POWDER-FREE SURGICAL GLOVE: Brand: ENCORE

## (undated) DEVICE — GLV SURG PREMIERPRO ORTHO LTX PF SZ8 BRN

## (undated) DEVICE — DECANT BG O JET

## (undated) DEVICE — 1010 S-DRAPE TOWEL DRAPE 10/BX: Brand: STERI-DRAPE™

## (undated) DEVICE — DUAL CUT SAGITTAL BLADE

## (undated) DEVICE — APPL CHLORAPREP W/TINT 26ML ORNG

## (undated) DEVICE — 3M™ STERI-STRIP™ REINFORCED ADHESIVE SKIN CLOSURES, R1547, 1/2 IN X 4 IN (12 MM X 100 MM), 6 STRIPS/ENVELOPE: Brand: 3M™ STERI-STRIP™

## (undated) DEVICE — SUT VIC 3/0 CTI 36IN J944H

## (undated) DEVICE — SOL ISO/ALC RUB 70PCT 4OZ

## (undated) DEVICE — APPL CHLORAPREP HI/LITE 26ML ORNG

## (undated) DEVICE — GLV SURG BIOGEL LTX PF 8

## (undated) DEVICE — SUT VIC 0 CT1 36IN J946H

## (undated) DEVICE — MAT FLR ABSORBENT LG 4FT 10 2.5FT

## (undated) DEVICE — BIPOLAR SEALER 23-112-1 AQM 6.0: Brand: AQUAMANTYS™

## (undated) DEVICE — OPTIFOAM GENTLE SA, POSTOP, 4X8: Brand: MEDLINE

## (undated) DEVICE — DRSNG BRDR MEPILEX P/OP SIL 4X8IN

## (undated) DEVICE — GLV SURG SIGNATURE ESSENTIAL PF LTX SZ8

## (undated) DEVICE — SYR CONTRL LUERLOK 10CC

## (undated) DEVICE — PK ANT HIP 40

## (undated) DEVICE — SUT MNCRYL 3/0 PS2 18IN MCP497G

## (undated) DEVICE — GLV SURG TRIUMPH CLASSIC PF LTX 8 STRL